# Patient Record
Sex: MALE | Race: WHITE | NOT HISPANIC OR LATINO | Employment: UNEMPLOYED | URBAN - METROPOLITAN AREA
[De-identification: names, ages, dates, MRNs, and addresses within clinical notes are randomized per-mention and may not be internally consistent; named-entity substitution may affect disease eponyms.]

---

## 2020-03-07 PROBLEM — R45.851 SUICIDAL IDEATION: Status: ACTIVE | Noted: 2020-03-07

## 2020-03-08 PROBLEM — Z59.00 HOMELESSNESS: Status: ACTIVE | Noted: 2020-03-08

## 2020-03-08 PROBLEM — F33.2 MAJOR DEPRESSIVE DISORDER, RECURRENT SEVERE WITHOUT PSYCHOTIC FEATURES: Status: ACTIVE | Noted: 2020-03-08

## 2020-03-08 PROBLEM — R63.6 UNDERWEIGHT: Status: ACTIVE | Noted: 2020-03-08

## 2020-03-08 PROBLEM — D50.9 IRON DEFICIENCY ANEMIA: Status: ACTIVE | Noted: 2020-03-08

## 2020-03-08 PROBLEM — D64.9 ANEMIA: Status: ACTIVE | Noted: 2020-03-08

## 2020-03-08 PROBLEM — G89.29 CHRONIC PAIN: Status: ACTIVE | Noted: 2020-03-08

## 2020-03-09 PROBLEM — F50.00 ANOREXIA NERVOSA: Status: ACTIVE | Noted: 2020-03-09

## 2020-03-09 PROBLEM — F17.200 TOBACCO USE DISORDER, MODERATE, DEPENDENCE: Status: ACTIVE | Noted: 2020-03-09

## 2020-03-11 PROBLEM — F60.3 BORDERLINE PERSONALITY DISORDER IN ADULT: Status: ACTIVE | Noted: 2020-03-11

## 2020-03-12 PROBLEM — F17.200 TOBACCO USE DISORDER: Status: ACTIVE | Noted: 2020-03-12

## 2020-04-16 PROBLEM — R45.851 DEPRESSION WITH SUICIDAL IDEATION: Status: ACTIVE | Noted: 2020-04-16

## 2020-04-16 PROBLEM — F32.A DEPRESSION WITH SUICIDAL IDEATION: Status: ACTIVE | Noted: 2020-04-16

## 2020-04-17 PROBLEM — F50.01 ANOREXIA NERVOSA, RESTRICTING TYPE: Status: ACTIVE | Noted: 2020-03-09

## 2020-04-21 PROBLEM — F41.9 ANXIETY: Status: ACTIVE | Noted: 2020-04-21

## 2020-06-24 ENCOUNTER — HOSPITAL ENCOUNTER (INPATIENT)
Facility: HOSPITAL | Age: 35
LOS: 2 days | Discharge: OTHER INSTITUTION | DRG: 918 | End: 2020-06-26
Attending: INTERNAL MEDICINE | Admitting: INTERNAL MEDICINE
Payer: MEDICAID

## 2020-06-24 DIAGNOSIS — T39.1X1A TYLENOL OVERDOSE: ICD-10-CM

## 2020-06-24 DIAGNOSIS — T50.901A OVERDOSE: ICD-10-CM

## 2020-06-24 LAB
ALBUMIN SERPL BCP-MCNC: 3.7 G/DL (ref 3.5–5.2)
ALBUMIN SERPL BCP-MCNC: 3.7 G/DL (ref 3.5–5.2)
ALP SERPL-CCNC: 127 U/L (ref 55–135)
ALP SERPL-CCNC: 127 U/L (ref 55–135)
ALT SERPL W/O P-5'-P-CCNC: 5631 U/L (ref 10–44)
ALT SERPL W/O P-5'-P-CCNC: 5631 U/L (ref 10–44)
AMPHET+METHAMPHET UR QL: NEGATIVE
AMYLASE SERPL-CCNC: 34 U/L (ref 20–110)
ANION GAP SERPL CALC-SCNC: 8 MMOL/L (ref 8–16)
APAP SERPL-MCNC: <3 UG/ML (ref 10–20)
APAP SERPL-MCNC: <3 UG/ML (ref 10–20)
AST SERPL-CCNC: 6022 U/L (ref 10–40)
AST SERPL-CCNC: 6022 U/L (ref 10–40)
BARBITURATES UR QL SCN>200 NG/ML: NEGATIVE
BASOPHILS # BLD AUTO: 0.03 K/UL (ref 0–0.2)
BASOPHILS NFR BLD: 0.6 % (ref 0–1.9)
BENZODIAZ UR QL SCN>200 NG/ML: NEGATIVE
BILIRUB DIRECT SERPL-MCNC: 0.4 MG/DL (ref 0.1–0.3)
BILIRUB SERPL-MCNC: 0.7 MG/DL (ref 0.1–1)
BILIRUB SERPL-MCNC: 0.7 MG/DL (ref 0.1–1)
BILIRUB UR QL STRIP: NEGATIVE
BNP SERPL-MCNC: 13 PG/ML (ref 0–99)
BUN SERPL-MCNC: 8 MG/DL (ref 6–20)
BZE UR QL SCN: NEGATIVE
CA-I BLDV-SCNC: 1.1 MMOL/L (ref 1.06–1.42)
CALCIUM SERPL-MCNC: 8.9 MG/DL (ref 8.7–10.5)
CANNABINOIDS UR QL SCN: NEGATIVE
CHLORIDE SERPL-SCNC: 105 MMOL/L (ref 95–110)
CLARITY UR REFRACT.AUTO: CLEAR
CO2 SERPL-SCNC: 24 MMOL/L (ref 23–29)
COLOR UR AUTO: YELLOW
CREAT SERPL-MCNC: 0.7 MG/DL (ref 0.5–1.4)
CREAT UR-MCNC: 29 MG/DL (ref 23–375)
DIFFERENTIAL METHOD: ABNORMAL
EOSINOPHIL # BLD AUTO: 0.2 K/UL (ref 0–0.5)
EOSINOPHIL NFR BLD: 3.1 % (ref 0–8)
ERYTHROCYTE [DISTWIDTH] IN BLOOD BY AUTOMATED COUNT: 12.8 % (ref 11.5–14.5)
EST. GFR  (AFRICAN AMERICAN): >60 ML/MIN/1.73 M^2
EST. GFR  (NON AFRICAN AMERICAN): >60 ML/MIN/1.73 M^2
ETHANOL UR-MCNC: <10 MG/DL
GLUCOSE SERPL-MCNC: 98 MG/DL (ref 70–110)
GLUCOSE UR QL STRIP: NEGATIVE
HCT VFR BLD AUTO: 35.5 % (ref 40–54)
HGB BLD-MCNC: 11.5 G/DL (ref 14–18)
HGB UR QL STRIP: NEGATIVE
IMM GRANULOCYTES # BLD AUTO: 0.01 K/UL (ref 0–0.04)
IMM GRANULOCYTES NFR BLD AUTO: 0.2 % (ref 0–0.5)
INR PPP: 1.7 (ref 0.8–1.2)
KETONES UR QL STRIP: NEGATIVE
LACTATE SERPL-SCNC: 0.9 MMOL/L (ref 0.5–2.2)
LEUKOCYTE ESTERASE UR QL STRIP: NEGATIVE
LIPASE SERPL-CCNC: 47 U/L (ref 4–60)
LYMPHOCYTES # BLD AUTO: 1.2 K/UL (ref 1–4.8)
LYMPHOCYTES NFR BLD: 24.7 % (ref 18–48)
MCH RBC QN AUTO: 28.5 PG (ref 27–31)
MCHC RBC AUTO-ENTMCNC: 32.4 G/DL (ref 32–36)
MCV RBC AUTO: 88 FL (ref 82–98)
METHADONE UR QL SCN>300 NG/ML: NEGATIVE
MONOCYTES # BLD AUTO: 0.3 K/UL (ref 0.3–1)
MONOCYTES NFR BLD: 6.7 % (ref 4–15)
NEUTROPHILS # BLD AUTO: 3.1 K/UL (ref 1.8–7.7)
NEUTROPHILS NFR BLD: 64.7 % (ref 38–73)
NITRITE UR QL STRIP: NEGATIVE
NRBC BLD-RTO: 0 /100 WBC
OPIATES UR QL SCN: NEGATIVE
PCP UR QL SCN>25 NG/ML: NEGATIVE
PH UR STRIP: 7 [PH] (ref 5–8)
PLATELET # BLD AUTO: 192 K/UL (ref 150–350)
PMV BLD AUTO: 10.4 FL (ref 9.2–12.9)
POTASSIUM SERPL-SCNC: 4.4 MMOL/L (ref 3.5–5.1)
PROT SERPL-MCNC: 6.7 G/DL (ref 6–8.4)
PROT SERPL-MCNC: 6.7 G/DL (ref 6–8.4)
PROT UR QL STRIP: NEGATIVE
PROTHROMBIN TIME: 16.7 SEC (ref 9–12.5)
RBC # BLD AUTO: 4.04 M/UL (ref 4.6–6.2)
SODIUM SERPL-SCNC: 137 MMOL/L (ref 136–145)
SP GR UR STRIP: 1.01 (ref 1–1.03)
TOXICOLOGY INFORMATION: NORMAL
TROPONIN I SERPL DL<=0.01 NG/ML-MCNC: <0.006 NG/ML (ref 0–0.03)
URN SPEC COLLECT METH UR: NORMAL
WBC # BLD AUTO: 4.78 K/UL (ref 3.9–12.7)

## 2020-06-24 PROCEDURE — 87517 HEPATITIS B DNA QUANT: CPT

## 2020-06-24 PROCEDURE — 86235 NUCLEAR ANTIGEN ANTIBODY: CPT | Mod: 91

## 2020-06-24 PROCEDURE — 82525 ASSAY OF COPPER: CPT

## 2020-06-24 PROCEDURE — 25000003 PHARM REV CODE 250: Performed by: NURSE PRACTITIONER

## 2020-06-24 PROCEDURE — 83605 ASSAY OF LACTIC ACID: CPT

## 2020-06-24 PROCEDURE — 94761 N-INVAS EAR/PLS OXIMETRY MLT: CPT

## 2020-06-24 PROCEDURE — 80307 DRUG TEST PRSMV CHEM ANLYZR: CPT

## 2020-06-24 PROCEDURE — 86703 HIV-1/HIV-2 1 RESULT ANTBDY: CPT

## 2020-06-24 PROCEDURE — 36415 COLL VENOUS BLD VENIPUNCTURE: CPT

## 2020-06-24 PROCEDURE — 82150 ASSAY OF AMYLASE: CPT

## 2020-06-24 PROCEDURE — 82728 ASSAY OF FERRITIN: CPT

## 2020-06-24 PROCEDURE — 82390 ASSAY OF CERULOPLASMIN: CPT

## 2020-06-24 PROCEDURE — 83690 ASSAY OF LIPASE: CPT

## 2020-06-24 PROCEDURE — 20000000 HC ICU ROOM

## 2020-06-24 PROCEDURE — 85025 COMPLETE CBC W/AUTO DIFF WBC: CPT

## 2020-06-24 PROCEDURE — 82150 ASSAY OF AMYLASE: CPT | Mod: 91

## 2020-06-24 PROCEDURE — 87521 HEPATITIS C PROBE&RVRS TRNSC: CPT

## 2020-06-24 PROCEDURE — 82784 ASSAY IGA/IGD/IGG/IGM EACH: CPT

## 2020-06-24 PROCEDURE — 86256 FLUORESCENT ANTIBODY TITER: CPT

## 2020-06-24 PROCEDURE — 80076 HEPATIC FUNCTION PANEL: CPT

## 2020-06-24 PROCEDURE — 63600175 PHARM REV CODE 636 W HCPCS: Performed by: NURSE PRACTITIONER

## 2020-06-24 PROCEDURE — 87529 HSV DNA AMP PROBE: CPT

## 2020-06-24 PROCEDURE — 85610 PROTHROMBIN TIME: CPT

## 2020-06-24 PROCEDURE — 86038 ANTINUCLEAR ANTIBODIES: CPT

## 2020-06-24 PROCEDURE — 83540 ASSAY OF IRON: CPT

## 2020-06-24 PROCEDURE — 82103 ALPHA-1-ANTITRYPSIN TOTAL: CPT

## 2020-06-24 PROCEDURE — 83880 ASSAY OF NATRIURETIC PEPTIDE: CPT

## 2020-06-24 PROCEDURE — 80329 ANALGESICS NON-OPIOID 1 OR 2: CPT

## 2020-06-24 PROCEDURE — 87799 DETECT AGENT NOS DNA QUANT: CPT

## 2020-06-24 PROCEDURE — 81003 URINALYSIS AUTO W/O SCOPE: CPT

## 2020-06-24 PROCEDURE — 80074 ACUTE HEPATITIS PANEL: CPT

## 2020-06-24 PROCEDURE — 82330 ASSAY OF CALCIUM: CPT

## 2020-06-24 PROCEDURE — 80329 ANALGESICS NON-OPIOID 1 OR 2: CPT | Mod: 59

## 2020-06-24 PROCEDURE — 80321 ALCOHOLS BIOMARKERS 1OR 2: CPT

## 2020-06-24 PROCEDURE — 84484 ASSAY OF TROPONIN QUANT: CPT

## 2020-06-24 PROCEDURE — 80053 COMPREHEN METABOLIC PANEL: CPT

## 2020-06-24 RX ORDER — SODIUM CHLORIDE 0.9 % (FLUSH) 0.9 %
10 SYRINGE (ML) INJECTION
Status: DISCONTINUED | OUTPATIENT
Start: 2020-06-24 | End: 2020-06-26 | Stop reason: HOSPADM

## 2020-06-24 RX ORDER — HEPARIN SODIUM 5000 [USP'U]/ML
5000 INJECTION, SOLUTION INTRAVENOUS; SUBCUTANEOUS EVERY 8 HOURS
Status: DISCONTINUED | OUTPATIENT
Start: 2020-06-24 | End: 2020-06-24

## 2020-06-24 RX ORDER — ONDANSETRON 2 MG/ML
4 INJECTION INTRAMUSCULAR; INTRAVENOUS ONCE
Status: COMPLETED | OUTPATIENT
Start: 2020-06-24 | End: 2020-06-24

## 2020-06-24 RX ORDER — ONDANSETRON 2 MG/ML
4 INJECTION INTRAMUSCULAR; INTRAVENOUS EVERY 6 HOURS PRN
Status: DISCONTINUED | OUTPATIENT
Start: 2020-06-24 | End: 2020-06-26 | Stop reason: HOSPADM

## 2020-06-24 RX ORDER — PANTOPRAZOLE SODIUM 40 MG/10ML
40 INJECTION, POWDER, LYOPHILIZED, FOR SOLUTION INTRAVENOUS DAILY
Status: DISCONTINUED | OUTPATIENT
Start: 2020-06-25 | End: 2020-06-25

## 2020-06-24 RX ADMIN — ACETYLCYSTEINE 7200 MG: 200 INJECTION, SOLUTION INTRAVENOUS at 09:06

## 2020-06-24 RX ADMIN — ONDANSETRON 4 MG: 2 INJECTION INTRAMUSCULAR; INTRAVENOUS at 08:06

## 2020-06-24 RX ADMIN — ACETYLCYSTEINE 2400 MG: 200 INJECTION, SOLUTION INTRAVENOUS at 10:06

## 2020-06-24 NOTE — PLAN OF CARE
Outside Transfer Acceptance Note        Patients name/MRN:   Dk Pereira, MRN: [id number]      Referring Physician or Mid-Level provider giving report:    Dario Wells MD     Referral Facility: [facility] MICU (All records scanned into [facility]): Admit 6/21/20     Date/Time of Acceptance:     6/24/20 at 10am     Accepting Physician for admission to hospital: MELISSA Quiroz MD ()     Accepting facility:     [facility]      Consulting Physicians from [facility] involved in case:    Dr. Stephy Lin MD., Hepatology [facility]     Reason for transfer request:       Pt has not been tested for covid, physician stated pt has not exhibited any symptoms.    Dk Pereira is a 34 y.o. male with a h/o major depressive disorder w/o PF, Anorexia Nervosa, Borderline PD, homelessness,  and sacral and calcaneal fractures.  He has been followed by psychiatry closely for SI and attempts in the past.  He was recently discharged from a 10 day psychiatric inpatient stay in 3/12/20 for acute depression, and then admitted again to inpatient psychiatry on (4/16-4/23) for anorexia nervosa and acute depressive episode.  He had suicidal ideation on that stay documented as well. His most recent stay in a psychiatry unit was apparently at [facility] on 6/1-6/18 where he was again treated for depression, anxiety and anorexia nervosa.  He was discharged to a group home afterwards, then onto the streets.      The patient now presents to [facility] ED after intentional tylenol overdose.  He is currently under PEC due to this and being combative.  He told the ED provider that he took 15-20 tylenol pills that he had purchased from [facility] on 6/20 10 to midnight, then he states he took another 30 pills at  Midnight and 2 on 6/21.  The patient has been on continuous acetylcysteine (started at but continues to have worsening liver function and enzymes. Dr. Wells requesting transfer to  facility with liver transplant capabilities.  At admission, his LFT's were   and , TB 1, Tylenol level of 243.  Current labs: CMP: BUN 6, CR 0.65, , K 4.2, ALB 3.3, TP 6.8,  ALT 6571, AST 36585, TB 0.8, INR 2.4, Tylenol <2.  CBC: WBC 3.5, HG 10.6, MCV 88, .  No radiographs were done.      He was admitted immediately to the MICU at [facility], and given NAC x 20 hours, then continued at 6.25mg/Kg/hr since that time.  He had to receive some Geodon in the ED.  Patient's mother is Stacy Gunn, cell: [phone].  His mental status has actually improved since admission.  No radiographs done.      To Do List upon arrival:     Consult Hepatology  Needs isolation precautions for COVID until screened in MICU  Continue N-acetylcysteine iv protocol at 6.25mg/kg/hr  Consult Psychiatry  Check NH3  Follow Mg, glucose, and Phos closely     Past Medical History:   Diagnosis Date    Anemia     Anorexia nervosa     Anxiety     Major depressive disorder     PTSD (post-traumatic stress disorder)      Past Psychiatric History:   Prior meds: remeron, effexor, prozac, lithium, cymbalta, gabapentin, Abilify (Hx of akathisia)  Previous psychiatric hospitalizations:   PCU w/ transfer to [facility] 10 days PTA  10 psych D/C 03/12/2020, see above for details  Previous diagnoses: MDD r/s w/o PF, Anorexia Nervosa, Borderline PD  Previous suicide attempts: multiple, OD, jumped off bridge (had to get surgical Tx)    Vital signs:     Temp:  [98.1 °F (36.7 °C)] 98.1 °F (36.7 °C)  Pulse:  [64] 64  Resp:  [20] 20  SpO2:  [100 %] 100 %  BP: (116)/(64) 116/64    Current facility-administered medications for this encounter.   N-Acetylcysteine iv    Current Outpatient Medications:     busPIRone (BUSPAR) 10 MG tablet, Take 1 tablet (10 mg total) by mouth 3 (three) times daily., Disp: 90 tablet, Rfl: 1    lithium (ESKALITH) 300 MG capsule, Take 1 capsule (300 mg total) by mouth 2 (two) times daily., Disp: 60 capsule,  Rfl: 1    mirtazapine (REMERON) 15 MG tablet, Take 1 tablet (15 mg total) by mouth nightly., Disp: 30 tablet, Rfl: 1    venlafaxine (EFFEXOR-XR) 75 MG 24 hr capsule, Take 3 capsules (225 mg total) by mouth every morning., Disp: 90 capsule, Rfl: 1    LABS:  No results found for this or any previous visit (from the past 24 hour(s)).   As per HPI    Imaging:  None     MELISSA Quiroz MD  Department of Hospital Medicine  Patient Flow [facility]/   [phone]

## 2020-06-24 NOTE — NURSING
Pt arrived to [facility]ICU with EMS from [place]; awake and alert, answering questions appropriately; NAC infusing at 26 cc/hr; placed in blue paper scrubs; house aware of need for sitter due to PEC status; CC at bedside

## 2020-06-25 PROBLEM — T39.1X2A SUICIDE ATTEMPT BY ACETAMINOPHEN OVERDOSE: Status: ACTIVE | Noted: 2020-06-25

## 2020-06-25 LAB
A1AT SERPL-MCNC: 155 MG/DL (ref 100–190)
ABO + RH BLD: NORMAL
ALBUMIN SERPL BCP-MCNC: 3.3 G/DL (ref 3.5–5.2)
ALBUMIN SERPL BCP-MCNC: 3.4 G/DL (ref 3.5–5.2)
ALBUMIN SERPL BCP-MCNC: 3.6 G/DL (ref 3.5–5.2)
ALP SERPL-CCNC: 101 U/L (ref 55–135)
ALP SERPL-CCNC: 101 U/L (ref 55–135)
ALP SERPL-CCNC: 109 U/L (ref 55–135)
ALP SERPL-CCNC: 121 U/L (ref 55–135)
ALP SERPL-CCNC: 130 U/L (ref 55–135)
ALT SERPL W/O P-5'-P-CCNC: 3307 U/L (ref 10–44)
ALT SERPL W/O P-5'-P-CCNC: 3660 U/L (ref 10–44)
ALT SERPL W/O P-5'-P-CCNC: 3781 U/L (ref 10–44)
ALT SERPL W/O P-5'-P-CCNC: 4248 U/L (ref 10–44)
ALT SERPL W/O P-5'-P-CCNC: 4248 U/L (ref 10–44)
AMYLASE SERPL-CCNC: 26 U/L (ref 20–110)
ANA SER QL IF: NORMAL
ANION GAP SERPL CALC-SCNC: 10 MMOL/L (ref 8–16)
ANION GAP SERPL CALC-SCNC: 6 MMOL/L (ref 8–16)
ANION GAP SERPL CALC-SCNC: 7 MMOL/L (ref 8–16)
ANION GAP SERPL CALC-SCNC: 8 MMOL/L (ref 8–16)
APAP SERPL-MCNC: <3 UG/ML (ref 10–20)
APAP SERPL-MCNC: <3 UG/ML (ref 10–20)
AST SERPL-CCNC: 1463 U/L (ref 10–40)
AST SERPL-CCNC: 1887 U/L (ref 10–40)
AST SERPL-CCNC: 2345 U/L (ref 10–40)
AST SERPL-CCNC: 3269 U/L (ref 10–40)
AST SERPL-CCNC: 3269 U/L (ref 10–40)
BASOPHILS # BLD AUTO: 0.03 K/UL (ref 0–0.2)
BASOPHILS # BLD AUTO: 0.04 K/UL (ref 0–0.2)
BASOPHILS NFR BLD: 0.8 % (ref 0–1.9)
BASOPHILS NFR BLD: 1.1 % (ref 0–1.9)
BASOPHILS NFR BLD: 1.3 % (ref 0–1.9)
BASOPHILS NFR BLD: 1.3 % (ref 0–1.9)
BILIRUB DIRECT SERPL-MCNC: 0.5 MG/DL (ref 0.1–0.3)
BILIRUB SERPL-MCNC: 0.5 MG/DL (ref 0.1–1)
BILIRUB SERPL-MCNC: 0.6 MG/DL (ref 0.1–1)
BILIRUB SERPL-MCNC: 0.8 MG/DL (ref 0.1–1)
BLD GP AB SCN CELLS X3 SERPL QL: NORMAL
BUN SERPL-MCNC: 6 MG/DL (ref 6–20)
BUN SERPL-MCNC: 7 MG/DL (ref 6–20)
BUN SERPL-MCNC: 8 MG/DL (ref 6–20)
BUN SERPL-MCNC: 8 MG/DL (ref 6–20)
CALCIUM SERPL-MCNC: 8.5 MG/DL (ref 8.7–10.5)
CALCIUM SERPL-MCNC: 8.5 MG/DL (ref 8.7–10.5)
CALCIUM SERPL-MCNC: 8.6 MG/DL (ref 8.7–10.5)
CALCIUM SERPL-MCNC: 9.2 MG/DL (ref 8.7–10.5)
CERULOPLASMIN SERPL-MCNC: 22 MG/DL (ref 15–45)
CHLORIDE SERPL-SCNC: 101 MMOL/L (ref 95–110)
CHLORIDE SERPL-SCNC: 103 MMOL/L (ref 95–110)
CHLORIDE SERPL-SCNC: 104 MMOL/L (ref 95–110)
CHLORIDE SERPL-SCNC: 107 MMOL/L (ref 95–110)
CO2 SERPL-SCNC: 22 MMOL/L (ref 23–29)
CO2 SERPL-SCNC: 23 MMOL/L (ref 23–29)
CO2 SERPL-SCNC: 25 MMOL/L (ref 23–29)
CO2 SERPL-SCNC: 25 MMOL/L (ref 23–29)
CREAT SERPL-MCNC: 0.6 MG/DL (ref 0.5–1.4)
CREAT SERPL-MCNC: 0.7 MG/DL (ref 0.5–1.4)
DIFFERENTIAL METHOD: ABNORMAL
EOSINOPHIL # BLD AUTO: 0.1 K/UL (ref 0–0.5)
EOSINOPHIL # BLD AUTO: 0.1 K/UL (ref 0–0.5)
EOSINOPHIL # BLD AUTO: 0.2 K/UL (ref 0–0.5)
EOSINOPHIL # BLD AUTO: 0.2 K/UL (ref 0–0.5)
EOSINOPHIL NFR BLD: 4 % (ref 0–8)
EOSINOPHIL NFR BLD: 4.3 % (ref 0–8)
EOSINOPHIL NFR BLD: 4.6 % (ref 0–8)
EOSINOPHIL NFR BLD: 4.6 % (ref 0–8)
ERYTHROCYTE [DISTWIDTH] IN BLOOD BY AUTOMATED COUNT: 12.8 % (ref 11.5–14.5)
ERYTHROCYTE [DISTWIDTH] IN BLOOD BY AUTOMATED COUNT: 12.9 % (ref 11.5–14.5)
ERYTHROCYTE [DISTWIDTH] IN BLOOD BY AUTOMATED COUNT: 13 % (ref 11.5–14.5)
ERYTHROCYTE [DISTWIDTH] IN BLOOD BY AUTOMATED COUNT: 13 % (ref 11.5–14.5)
EST. GFR  (AFRICAN AMERICAN): >60 ML/MIN/1.73 M^2
EST. GFR  (NON AFRICAN AMERICAN): >60 ML/MIN/1.73 M^2
FERRITIN SERPL-MCNC: 178 NG/ML (ref 20–300)
GLUCOSE SERPL-MCNC: 112 MG/DL (ref 70–110)
GLUCOSE SERPL-MCNC: 244 MG/DL (ref 70–110)
GLUCOSE SERPL-MCNC: 289 MG/DL (ref 70–110)
GLUCOSE SERPL-MCNC: 319 MG/DL (ref 70–110)
HAV IGM SERPL QL IA: NEGATIVE
HBV CORE IGM SERPL QL IA: NEGATIVE
HBV SURFACE AG SERPL QL IA: NEGATIVE
HCT VFR BLD AUTO: 30.7 % (ref 40–54)
HCT VFR BLD AUTO: 30.8 % (ref 40–54)
HCT VFR BLD AUTO: 31.1 % (ref 40–54)
HCT VFR BLD AUTO: 34.2 % (ref 40–54)
HCV AB SERPL QL IA: NEGATIVE
HGB BLD-MCNC: 10 G/DL (ref 14–18)
HGB BLD-MCNC: 10.1 G/DL (ref 14–18)
HGB BLD-MCNC: 10.3 G/DL (ref 14–18)
HGB BLD-MCNC: 11.1 G/DL (ref 14–18)
HIV 1+2 AB+HIV1 P24 AG SERPL QL IA: NEGATIVE
IGG SERPL-MCNC: 731 MG/DL (ref 650–1600)
IMM GRANULOCYTES # BLD AUTO: 0 K/UL (ref 0–0.04)
IMM GRANULOCYTES # BLD AUTO: 0.01 K/UL (ref 0–0.04)
IMM GRANULOCYTES NFR BLD AUTO: 0 % (ref 0–0.5)
IMM GRANULOCYTES NFR BLD AUTO: 0.3 % (ref 0–0.5)
INR PPP: 1.2 (ref 0.8–1.2)
INR PPP: 1.3 (ref 0.8–1.2)
INR PPP: 1.5 (ref 0.8–1.2)
INR PPP: 1.6 (ref 0.8–1.2)
IRON SERPL-MCNC: 16 UG/DL (ref 45–160)
LYMPHOCYTES # BLD AUTO: 0.8 K/UL (ref 1–4.8)
LYMPHOCYTES # BLD AUTO: 1 K/UL (ref 1–4.8)
LYMPHOCYTES # BLD AUTO: 1.3 K/UL (ref 1–4.8)
LYMPHOCYTES # BLD AUTO: 1.3 K/UL (ref 1–4.8)
LYMPHOCYTES NFR BLD: 26.6 % (ref 18–48)
LYMPHOCYTES NFR BLD: 32.7 % (ref 18–48)
LYMPHOCYTES NFR BLD: 33.8 % (ref 18–48)
LYMPHOCYTES NFR BLD: 35 % (ref 18–48)
MAGNESIUM SERPL-MCNC: 1.9 MG/DL (ref 1.6–2.6)
MCH RBC QN AUTO: 28.4 PG (ref 27–31)
MCH RBC QN AUTO: 28.5 PG (ref 27–31)
MCH RBC QN AUTO: 28.7 PG (ref 27–31)
MCH RBC QN AUTO: 28.8 PG (ref 27–31)
MCHC RBC AUTO-ENTMCNC: 32.5 G/DL (ref 32–36)
MCHC RBC AUTO-ENTMCNC: 32.5 G/DL (ref 32–36)
MCHC RBC AUTO-ENTMCNC: 32.9 G/DL (ref 32–36)
MCHC RBC AUTO-ENTMCNC: 33.1 G/DL (ref 32–36)
MCV RBC AUTO: 87 FL (ref 82–98)
MCV RBC AUTO: 87 FL (ref 82–98)
MCV RBC AUTO: 88 FL (ref 82–98)
MCV RBC AUTO: 88 FL (ref 82–98)
MITOCHONDRIA AB TITR SER IF: NORMAL {TITER}
MONOCYTES # BLD AUTO: 0.3 K/UL (ref 0.3–1)
MONOCYTES # BLD AUTO: 0.4 K/UL (ref 0.3–1)
MONOCYTES NFR BLD: 10.5 % (ref 4–15)
MONOCYTES NFR BLD: 11.2 % (ref 4–15)
MONOCYTES NFR BLD: 7.8 % (ref 4–15)
MONOCYTES NFR BLD: 9.8 % (ref 4–15)
NEUTROPHILS # BLD AUTO: 1.6 K/UL (ref 1.8–7.7)
NEUTROPHILS # BLD AUTO: 1.8 K/UL (ref 1.8–7.7)
NEUTROPHILS # BLD AUTO: 1.8 K/UL (ref 1.8–7.7)
NEUTROPHILS # BLD AUTO: 2 K/UL (ref 1.8–7.7)
NEUTROPHILS NFR BLD: 47.8 % (ref 38–73)
NEUTROPHILS NFR BLD: 51.3 % (ref 38–73)
NEUTROPHILS NFR BLD: 53.3 % (ref 38–73)
NEUTROPHILS NFR BLD: 57.3 % (ref 38–73)
NRBC BLD-RTO: 0 /100 WBC
PHOSPHATE SERPL-MCNC: 3.2 MG/DL (ref 2.7–4.5)
PHOSPHATE SERPL-MCNC: 3.4 MG/DL (ref 2.7–4.5)
PHOSPHATE SERPL-MCNC: 3.8 MG/DL (ref 2.7–4.5)
PHOSPHATE SERPL-MCNC: 4 MG/DL (ref 2.7–4.5)
PLATELET # BLD AUTO: 170 K/UL (ref 150–350)
PLATELET # BLD AUTO: 181 K/UL (ref 150–350)
PLATELET # BLD AUTO: 185 K/UL (ref 150–350)
PLATELET # BLD AUTO: 193 K/UL (ref 150–350)
PMV BLD AUTO: 10 FL (ref 9.2–12.9)
PMV BLD AUTO: 9.9 FL (ref 9.2–12.9)
POTASSIUM SERPL-SCNC: 3.4 MMOL/L (ref 3.5–5.1)
POTASSIUM SERPL-SCNC: 4 MMOL/L (ref 3.5–5.1)
POTASSIUM SERPL-SCNC: 4.2 MMOL/L (ref 3.5–5.1)
POTASSIUM SERPL-SCNC: 4.2 MMOL/L (ref 3.5–5.1)
PROT SERPL-MCNC: 6.1 G/DL (ref 6–8.4)
PROT SERPL-MCNC: 6.4 G/DL (ref 6–8.4)
PROT SERPL-MCNC: 6.7 G/DL (ref 6–8.4)
PROTHROMBIN TIME: 11.9 SEC (ref 9–12.5)
PROTHROMBIN TIME: 13.3 SEC (ref 9–12.5)
PROTHROMBIN TIME: 14.3 SEC (ref 9–12.5)
PROTHROMBIN TIME: 15.6 SEC (ref 9–12.5)
RBC # BLD AUTO: 3.52 M/UL (ref 4.6–6.2)
RBC # BLD AUTO: 3.54 M/UL (ref 4.6–6.2)
RBC # BLD AUTO: 3.58 M/UL (ref 4.6–6.2)
RBC # BLD AUTO: 3.87 M/UL (ref 4.6–6.2)
SARS-COV-2 RDRP RESP QL NAA+PROBE: NEGATIVE
SATURATED IRON: 6 % (ref 20–50)
SMOOTH MUSCLE AB TITR SER IF: NORMAL {TITER}
SODIUM SERPL-SCNC: 133 MMOL/L (ref 136–145)
SODIUM SERPL-SCNC: 135 MMOL/L (ref 136–145)
SODIUM SERPL-SCNC: 135 MMOL/L (ref 136–145)
SODIUM SERPL-SCNC: 138 MMOL/L (ref 136–145)
TOTAL IRON BINDING CAPACITY: 269 UG/DL (ref 250–450)
TRANSFERRIN SERPL-MCNC: 182 MG/DL (ref 200–375)
WBC # BLD AUTO: 3.05 K/UL (ref 3.9–12.7)
WBC # BLD AUTO: 3.06 K/UL (ref 3.9–12.7)
WBC # BLD AUTO: 3.66 K/UL (ref 3.9–12.7)
WBC # BLD AUTO: 3.73 K/UL (ref 3.9–12.7)

## 2020-06-25 PROCEDURE — 84100 ASSAY OF PHOSPHORUS: CPT | Mod: 91

## 2020-06-25 PROCEDURE — 84100 ASSAY OF PHOSPHORUS: CPT

## 2020-06-25 PROCEDURE — 63600175 PHARM REV CODE 636 W HCPCS: Performed by: INTERNAL MEDICINE

## 2020-06-25 PROCEDURE — 90792 PR PSYCHIATRIC DIAGNOSTIC EVALUATION W/MEDICAL SERVICES: ICD-10-PCS | Mod: AF,HB,S$PBB, | Performed by: PSYCHIATRY & NEUROLOGY

## 2020-06-25 PROCEDURE — 63600175 PHARM REV CODE 636 W HCPCS: Performed by: STUDENT IN AN ORGANIZED HEALTH CARE EDUCATION/TRAINING PROGRAM

## 2020-06-25 PROCEDURE — 80329 ANALGESICS NON-OPIOID 1 OR 2: CPT

## 2020-06-25 PROCEDURE — 85610 PROTHROMBIN TIME: CPT

## 2020-06-25 PROCEDURE — U0002 COVID-19 LAB TEST NON-CDC: HCPCS

## 2020-06-25 PROCEDURE — 11000001 HC ACUTE MED/SURG PRIVATE ROOM

## 2020-06-25 PROCEDURE — 80053 COMPREHEN METABOLIC PANEL: CPT

## 2020-06-25 PROCEDURE — 25000003 PHARM REV CODE 250: Performed by: INTERNAL MEDICINE

## 2020-06-25 PROCEDURE — 90792 PSYCH DIAG EVAL W/MED SRVCS: CPT | Mod: AF,HB,S$PBB, | Performed by: PSYCHIATRY & NEUROLOGY

## 2020-06-25 PROCEDURE — 25000003 PHARM REV CODE 250: Performed by: HOSPITALIST

## 2020-06-25 PROCEDURE — 85025 COMPLETE CBC W/AUTO DIFF WBC: CPT

## 2020-06-25 PROCEDURE — 63600175 PHARM REV CODE 636 W HCPCS: Performed by: EMERGENCY MEDICINE

## 2020-06-25 PROCEDURE — 36415 COLL VENOUS BLD VENIPUNCTURE: CPT

## 2020-06-25 PROCEDURE — 63600175 PHARM REV CODE 636 W HCPCS: Performed by: NURSE PRACTITIONER

## 2020-06-25 PROCEDURE — 25000003 PHARM REV CODE 250: Performed by: STUDENT IN AN ORGANIZED HEALTH CARE EDUCATION/TRAINING PROGRAM

## 2020-06-25 PROCEDURE — 85610 PROTHROMBIN TIME: CPT | Mod: 91

## 2020-06-25 PROCEDURE — 25000003 PHARM REV CODE 250: Performed by: NURSE PRACTITIONER

## 2020-06-25 PROCEDURE — C9113 INJ PANTOPRAZOLE SODIUM, VIA: HCPCS | Performed by: EMERGENCY MEDICINE

## 2020-06-25 PROCEDURE — 83735 ASSAY OF MAGNESIUM: CPT

## 2020-06-25 PROCEDURE — 87040 BLOOD CULTURE FOR BACTERIA: CPT | Mod: 59

## 2020-06-25 PROCEDURE — 86850 RBC ANTIBODY SCREEN: CPT

## 2020-06-25 PROCEDURE — 80076 HEPATIC FUNCTION PANEL: CPT

## 2020-06-25 PROCEDURE — 99291 PR CRITICAL CARE, E/M 30-74 MINUTES: ICD-10-PCS | Mod: ,,, | Performed by: INTERNAL MEDICINE

## 2020-06-25 PROCEDURE — 99233 SBSQ HOSP IP/OBS HIGH 50: CPT | Mod: ,,, | Performed by: INTERNAL MEDICINE

## 2020-06-25 PROCEDURE — 99291 CRITICAL CARE FIRST HOUR: CPT | Mod: ,,, | Performed by: INTERNAL MEDICINE

## 2020-06-25 PROCEDURE — 94761 N-INVAS EAR/PLS OXIMETRY MLT: CPT

## 2020-06-25 PROCEDURE — 99233 PR SUBSEQUENT HOSPITAL CARE,LEVL III: ICD-10-PCS | Mod: ,,, | Performed by: INTERNAL MEDICINE

## 2020-06-25 RX ORDER — MAGNESIUM SULFATE HEPTAHYDRATE 40 MG/ML
2 INJECTION, SOLUTION INTRAVENOUS ONCE
Status: COMPLETED | OUTPATIENT
Start: 2020-06-25 | End: 2020-06-25

## 2020-06-25 RX ORDER — PANTOPRAZOLE SODIUM 40 MG/1
40 TABLET, DELAYED RELEASE ORAL DAILY
Status: DISCONTINUED | OUTPATIENT
Start: 2020-06-26 | End: 2020-06-26 | Stop reason: HOSPADM

## 2020-06-25 RX ORDER — LIDOCAINE 50 MG/G
1 PATCH TOPICAL
Status: DISCONTINUED | OUTPATIENT
Start: 2020-06-25 | End: 2020-06-26 | Stop reason: HOSPADM

## 2020-06-25 RX ORDER — POTASSIUM CHLORIDE 20 MEQ/1
40 TABLET, EXTENDED RELEASE ORAL ONCE
Status: COMPLETED | OUTPATIENT
Start: 2020-06-25 | End: 2020-06-25

## 2020-06-25 RX ADMIN — PHYTONADIONE 10 MG: 10 INJECTION, EMULSION INTRAMUSCULAR; INTRAVENOUS; SUBCUTANEOUS at 08:06

## 2020-06-25 RX ADMIN — CEFTRIAXONE 2 G: 2 INJECTION, SOLUTION INTRAVENOUS at 08:06

## 2020-06-25 RX ADMIN — POTASSIUM CHLORIDE 40 MEQ: 1500 TABLET, EXTENDED RELEASE ORAL at 06:06

## 2020-06-25 RX ADMIN — ACETYLCYSTEINE 6.25 MG/KG/HR: 200 INJECTION, SOLUTION INTRAVENOUS at 08:06

## 2020-06-25 RX ADMIN — ACETYLCYSTEINE 4800 MG: 200 INJECTION, SOLUTION INTRAVENOUS at 02:06

## 2020-06-25 RX ADMIN — LIDOCAINE 1 PATCH: 50 PATCH CUTANEOUS at 08:06

## 2020-06-25 RX ADMIN — PANTOPRAZOLE SODIUM 40 MG: 40 INJECTION, POWDER, LYOPHILIZED, FOR SOLUTION INTRAVENOUS at 08:06

## 2020-06-25 RX ADMIN — MAGNESIUM SULFATE 2 G: 2 INJECTION INTRAVENOUS at 06:06

## 2020-06-25 RX ADMIN — ONDANSETRON 4 MG: 2 INJECTION, SOLUTION INTRAMUSCULAR; INTRAVENOUS at 05:06

## 2020-06-25 NOTE — ASSESSMENT & PLAN NOTE
Patient presents to hospital for intentional tyleonol overdose. Patient is unclear what dose he used, but estimates roughly 50 pills. AST and ALT elevated to 6k and 5.6 k. Has been treating with NAC continuously since then. Has been treated using 20 hour protocol with 150 mg/kg followed by 50 mg/kg, followed by 100 mg/kg. Tylenol levels currently undetectable and most recent AST and ALT 3269 and 4248. INR remains elevated at 1.6 from 1.7.    - Recommend to continue NAC at a maintenance dose for additional 24 hours. Although AST and ALT improving, INR still elevated to 1.6. Can continue with 4th dose per order set at 6.25 mg/kg/hr; pharmacy can help adjust dose as appropriate. Continue to follow LFT and INR

## 2020-06-25 NOTE — SUBJECTIVE & OBJECTIVE
"  Family History     None        Tobacco Use    Smoking status: Current Every Day Smoker    Smokeless tobacco: Never Used   Substance and Sexual Activity    Alcohol use: Never     Frequency: Never    Drug use: Never    Sexual activity: Not on file     Psychotherapeutics (From admission, onward)    None           Review of Systems  Objective:     Vital Signs (Most Recent):  Temp: 98.4 °F (36.9 °C) (06/25/20 1100)  Pulse: 83 (06/25/20 1100)  Resp: 20 (06/25/20 1100)  BP: 105/70 (06/25/20 1100)  SpO2: 97 % (06/25/20 1100) Vital Signs (24h Range):  Temp:  [97.6 °F (36.4 °C)-98.4 °F (36.9 °C)] 98.4 °F (36.9 °C)  Pulse:  [] 83  Resp:  [12-76] 20  SpO2:  [96 %-100 %] 97 %  BP: ()/(54-79) 105/70     Height: 5' 6" (167.6 cm)  Weight: 48.2 kg (106 lb 4.2 oz)  Body mass index is 17.15 kg/m².      Intake/Output Summary (Last 24 hours) at 6/25/2020 1141  Last data filed at 6/25/2020 1000  Gross per 24 hour   Intake 268 ml   Output 2820 ml   Net -2552 ml       Physical Exam  Psychiatric:      Comments: Appearance: Appears stated age, adequately groomed  Behavior: calm and cooperative with interview, adequate eye contact  Motor: no PMA/PMR, no tics or tremors  Speech: normal rate, normal volume, normal quantity  Mood: "indifferent"  Affect: nervous smile, isolation of affect  Thought Content: +SI, -HI, no delusional content elicited   Thought Process: linear, goal directed  Perceptual Disturbances: -AH, -VH, not actively responding to internal stimuli, does not appear internally preoccupied   Orientation: A&Ox4  Memory: intact recent, intact remote  Attention: did not assess [facility]  Insight: limited to fair   Judgement: poor          Significant Labs: All pertinent labs within the past 24 hours have been reviewed.    Significant Imaging: I have reviewed all pertinent imaging results/findings within the past 24 hours.  "

## 2020-06-25 NOTE — H&P
[facility]  Critical Care Medicine  History & Physical    Patient Name: Dk Pereira  MRN: [id number]  Admission Date: 6/24/2020  Hospital Length of Stay: 1 days  Code Status: Full Code  Attending Physician: Adams Walsh MD   Primary Care Provider: Primary Doctor No   Principal Problem: <principal problem not specified>    Subjective:     HPI:  Dk Pereira is a 34 y.o. male with a h/o major depressive disorder w/o PF, Anorexia Nervosa, Borderline PD, homelessness,  and sacral and calcaneal fractures.  He has been followed by psychiatry closely for SI and attempts in the past ( x2).  He was recently discharged from a 10 day psychiatric inpatient stay in 3/12/20 for acute depression, and then admitted again to inpatient psychiatry on (4/16-4/23) for anorexia nervosa and acute depressive episode.  He had suicidal ideation on that stay documented as well. His most recent stay in a psychiatry unit was apparently at [facility] on 6/1-6/18 where he was again treated for depression, anxiety and anorexia nervosa.  He was discharged to a group home afterwards, then onto the streets.       The patient now presents to [facility] ED after intentional tylenol overdose.  He told the ED provider that he took 15-20 tylenol pills at midnight  then he states he took another 30 pills at  2am on 6/21.At admission, his LFT's were   and , T. Bili 1, Tylenol level of 243. Given NAC x 20 hours, then continued at 6.25mg/Kg/hr since that time. AST/ALT have continued to increase. Now AST 6022 ALT 5631. INR 1.7.  The patient has been on continuous acetylcysteine. Dr. Wells requesting transfer to facility with liver transplant capabilities.               Hospital/ICU Course:  6/21: presented to [facility] for intentional tylenol OD. Was admitted and started on NAC.   6/24: His transaminites continued to worsen and he was transferred to [facility] for liver TP eval         Past Medical History:   Diagnosis Date    Anemia     Anorexia nervosa     Anxiety     Major depressive disorder     PTSD (post-traumatic stress disorder)        Past Surgical History:   Procedure Laterality Date    BACK SURGERY      FRACTURE SURGERY         Review of patient's allergies indicates:  No Known Allergies    Family History     None        Tobacco Use    Smoking status: Current Every Day Smoker    Smokeless tobacco: Never Used   Substance and Sexual Activity    Alcohol use: Never     Frequency: Never    Drug use: Never    Sexual activity: Not on file      Review of Systems   Constitutional: Negative for chills and fever.   HENT: Negative for drooling and sore throat.    Eyes: Negative for pain and discharge.   Respiratory: Negative for cough and shortness of breath.    Cardiovascular: Negative for chest pain and palpitations.   Gastrointestinal: Positive for abdominal pain. Negative for diarrhea, nausea and vomiting.   Endocrine: Negative for polyuria.   Genitourinary: Negative for dysuria and flank pain.   Musculoskeletal: Negative for back pain and myalgias.   Skin: Negative for rash and wound.   Neurological: Negative for seizures, weakness, numbness and headaches.     Objective:     Vital Signs (Most Recent):  Temp: 97.7 °F (36.5 °C) (06/24/20 2300)  Pulse: 71 (06/25/20 0200)  Resp: 17 (06/25/20 0200)  BP: (!) 93/59 (06/25/20 0200)  SpO2: 98 % (06/25/20 0200) Vital Signs (24h Range):  Temp:  [97.7 °F (36.5 °C)-98.1 °F (36.7 °C)] 97.7 °F (36.5 °C)  Pulse:  [64-84] 71  Resp:  [12-32] 17  SpO2:  [97 %-100 %] 98 %  BP: ()/(54-79) 93/59   Weight: 48.2 kg (106 lb 4.2 oz)  Body mass index is 17.15 kg/m².      Intake/Output Summary (Last 24 hours) at 6/25/2020 0219  Last data filed at 6/25/2020 0200  Gross per 24 hour   Intake --   Output 1650 ml   Net -1650 ml       Physical Exam  Vitals signs and nursing note reviewed.   Constitutional:       Appearance: He is not ill-appearing or  toxic-appearing.   HENT:      Head: Normocephalic and atraumatic.      Nose: Nose normal. No congestion.      Mouth/Throat:      Mouth: Mucous membranes are moist.   Eyes:      Extraocular Movements: Extraocular movements intact.      Conjunctiva/sclera: Conjunctivae normal.      Pupils: Pupils are equal, round, and reactive to light.   Neck:      Musculoskeletal: Normal range of motion. No muscular tenderness.   Cardiovascular:      Rate and Rhythm: Normal rate.      Pulses: Normal pulses.      Heart sounds: Normal heart sounds.   Pulmonary:      Effort: Pulmonary effort is normal.      Breath sounds: Normal breath sounds.   Abdominal:      General: Abdomen is flat. There is no distension.      Palpations: Abdomen is soft.      Tenderness: There is abdominal tenderness (RUQ ). There is no guarding.   Musculoskeletal: Normal range of motion.         General: No swelling or tenderness.   Skin:     General: Skin is warm.      Coloration: Skin is not jaundiced.      Findings: No bruising.   Neurological:      General: No focal deficit present.      Mental Status: He is alert and oriented to person, place, and time.      Sensory: No sensory deficit.      Motor: No weakness.         Vents:     Lines/Drains/Airways     Peripheral Intravenous Line                 Peripheral IV - Single Lumen 06/24/20 1840 20 G Left Wrist less than 1 day         Peripheral IV - Single Lumen 06/24/20 1840 20 G Right Wrist less than 1 day         Peripheral IV - Single Lumen 06/24/20 1859 18 G Right Antecubital less than 1 day              Significant Labs:    CBC/Anemia Profile:  Recent Labs   Lab 06/24/20 1926 06/24/20  2247   WBC 4.78  --    HGB 11.5*  --    HCT 35.5*  --      --    MCV 88  --    RDW 12.8  --    IRON  --  16*   FERRITIN  --  178        Chemistries:  Recent Labs   Lab 06/24/20 1958      K 4.4      CO2 24   BUN 8   CREATININE 0.7   CALCIUM 8.9   ALBUMIN 3.7  3.7   PROT 6.7  6.7   BILITOT 0.7  0.7    ALKPHOS 127  127   ALT 5,631*  5,631*   AST 6,022*  6,022*     INR 1.7  Lipase 47    Significant Imaging: I have reviewed all pertinent imaging results/findings within the past 24 hours.    Assessment/Plan:     Neuro  Chronic pain  - 2/2 sacral and calcaneal fractures.  - Resting comfortably at this time.   - if pain medication is necessary, use low dose opiates as they are hepatically metabolized     Psychiatric  Anxiety  - see MDDO     Generalized anxiety disorder  - see MDDO     Depression  - see MDDO     Borderline personality disorder  - see MDDO     Major depressive disorder, recurrent episode, moderate  - Hx of SI with attempts in the past ( x2).  He was recently discharged from a 10 day psychiatric inpatient stay in 3/12/20 for acute depression, and then admitted again to inpatient psychiatry on (4/16-4/23) for anorexia nervosa and acute depressive episode.   - He will likely need inpatient treatment following discharge.   - Will consult psych     Endocrine  Underweight  - Hx of anorexia.   - On regular diet.  - Zofran PRN     Other  Tylenol overdose  - Per Pt he took 15-20 tylenol pills (500mg each) on 6/20 , then he states he took another 30 pills at  Midnight. Potentially he ingesting 59029- [id number]. Toxic level is >10g or >200mg/kg. There was also considerable delay in his presentation to the ER.   - At admission, his LFT's were   and , T. Bili 1, Tylenol level of 243.  - Given NAC x 20 hours, then continued at 6.25mg/Kg/hr since that time. AST/ALT have continued to increase. Now AST 6022 ALT 5631. INR 1.7. continue Vit k 10mg IV x3 days   - Continue NAC. Tylenol now < 3. Continue to trend CBC, CMP, Phos, INR q6h  - Hepatitis workup in process. F/u RUQ US.   - Liver TP consult.           Critical Care Daily Checklist:    A: Awake: RASS Goal/Actual Goal:    Actual: Chandler Agitation Sedation Scale (RASS): Alert and calm   B: Spontaneous Breathing Trial Performed?  No    C: SAT & SBT  Coordinated?  No                      D: Delirium: CAM-ICU Overall CAM-ICU: Negative   E: Early Mobility Performed? Yes   F: Feeding Goal:    Status:     Current Diet Order   Procedures    Diet Adult Regular (IDDSI Level 7) [facility]; PEC/CEC - Styrofoam     Order Specific Question:   Indicate patient location for additional diet options:     Answer:   [facility]     Order Specific Question:   Tray type:     Answer:   PEC/CEC - Styrofoam      AS: Analgesia/Sedation No    T: Thromboembolic Prophylaxis No    H: HOB > 300 Yes   U: Stress Ulcer Prophylaxis (if needed) No    G: Glucose Control No    B: Bowel Function  No    I: Indwelling Catheter (Lines & Posadas) Necessity No    D: De-escalation of Antimicrobials/Pharmacotherapies No     Plan for the day/ETD No     Code Status:  Family/Goals of Care: Full Code         Critical secondary to Patient has a condition that poses threat to life and bodily function: Liver failure      Critical care was time spent personally by me on the following activities: development of treatment plan with patient or surrogate and bedside caregivers, discussions with consultants, evaluation of patient's response to treatment, examination of patient, ordering and performing treatments and interventions, ordering and review of laboratory studies, ordering and review of radiographic studies, pulse oximetry, re-evaluation of patient's condition. This critical care time did not overlap with that of any other provider or involve time for any procedures.     Noble Dangelo MD  Critical Care Medicine  [facility]

## 2020-06-25 NOTE — SUBJECTIVE & OBJECTIVE
Past Medical History:   Diagnosis Date    Anemia     Anorexia nervosa     Anxiety     Major depressive disorder     PTSD (post-traumatic stress disorder)        Past Surgical History:   Procedure Laterality Date    BACK SURGERY      FRACTURE SURGERY         Review of patient's allergies indicates:  No Known Allergies    Family History     None        Tobacco Use    Smoking status: Current Every Day Smoker    Smokeless tobacco: Never Used   Substance and Sexual Activity    Alcohol use: Never     Frequency: Never    Drug use: Never    Sexual activity: Not on file      Review of Systems   Constitutional: Negative for chills and fever.   HENT: Negative for drooling and sore throat.    Eyes: Negative for pain and discharge.   Respiratory: Negative for cough and shortness of breath.    Cardiovascular: Negative for chest pain and palpitations.   Gastrointestinal: Positive for abdominal pain. Negative for diarrhea, nausea and vomiting.   Endocrine: Negative for polyuria.   Genitourinary: Negative for dysuria and flank pain.   Musculoskeletal: Negative for back pain and myalgias.   Skin: Negative for rash and wound.   Neurological: Negative for seizures, weakness, numbness and headaches.     Objective:     Vital Signs (Most Recent):  Temp: 97.7 °F (36.5 °C) (06/24/20 2300)  Pulse: 71 (06/25/20 0200)  Resp: 17 (06/25/20 0200)  BP: (!) 93/59 (06/25/20 0200)  SpO2: 98 % (06/25/20 0200) Vital Signs (24h Range):  Temp:  [97.7 °F (36.5 °C)-98.1 °F (36.7 °C)] 97.7 °F (36.5 °C)  Pulse:  [64-84] 71  Resp:  [12-32] 17  SpO2:  [97 %-100 %] 98 %  BP: ()/(54-79) 93/59   Weight: 48.2 kg (106 lb 4.2 oz)  Body mass index is 17.15 kg/m².      Intake/Output Summary (Last 24 hours) at 6/25/2020 0219  Last data filed at 6/25/2020 0200  Gross per 24 hour   Intake --   Output 1650 ml   Net -1650 ml       Physical Exam  Vitals signs and nursing note reviewed.   Constitutional:       Appearance: He is not ill-appearing or  toxic-appearing.   HENT:      Head: Normocephalic and atraumatic.      Nose: Nose normal. No congestion.      Mouth/Throat:      Mouth: Mucous membranes are moist.   Eyes:      Extraocular Movements: Extraocular movements intact.      Conjunctiva/sclera: Conjunctivae normal.      Pupils: Pupils are equal, round, and reactive to light.   Neck:      Musculoskeletal: Normal range of motion. No muscular tenderness.   Cardiovascular:      Rate and Rhythm: Normal rate.      Pulses: Normal pulses.      Heart sounds: Normal heart sounds.   Pulmonary:      Effort: Pulmonary effort is normal.      Breath sounds: Normal breath sounds.   Abdominal:      General: Abdomen is flat. There is no distension.      Palpations: Abdomen is soft.      Tenderness: There is abdominal tenderness (RUQ ). There is no guarding.   Musculoskeletal: Normal range of motion.         General: No swelling or tenderness.   Skin:     General: Skin is warm.      Coloration: Skin is not jaundiced.      Findings: No bruising.   Neurological:      General: No focal deficit present.      Mental Status: He is alert and oriented to person, place, and time.      Sensory: No sensory deficit.      Motor: No weakness.         Vents:     Lines/Drains/Airways     Peripheral Intravenous Line                 Peripheral IV - Single Lumen 06/24/20 1840 20 G Left Wrist less than 1 day         Peripheral IV - Single Lumen 06/24/20 1840 20 G Right Wrist less than 1 day         Peripheral IV - Single Lumen 06/24/20 1859 18 G Right Antecubital less than 1 day              Significant Labs:    CBC/Anemia Profile:  Recent Labs   Lab 06/24/20 1926 06/24/20  2247   WBC 4.78  --    HGB 11.5*  --    HCT 35.5*  --      --    MCV 88  --    RDW 12.8  --    IRON  --  16*   FERRITIN  --  178        Chemistries:  Recent Labs   Lab 06/24/20 1958      K 4.4      CO2 24   BUN 8   CREATININE 0.7   CALCIUM 8.9   ALBUMIN 3.7  3.7   PROT 6.7  6.7   BILITOT 0.7  0.7    ALKPHOS 127  127   ALT 5,631*  5,631*   AST 6,022*  6,022*     INR 1.7  Lipase 47    Significant Imaging: I have reviewed all pertinent imaging results/findings within the past 24 hours.

## 2020-06-25 NOTE — CONSULTS
[facility]  Hepatology  Consult Note    Patient Name: Dk Pereira  MRN: [id number]  Admission Date: 6/24/2020  Hospital Length of Stay: 1 days  Attending Provider: Adams Walsh MD   Primary Care Physician: Primary Doctor No  Principal Problem:<principal problem not specified>    Inpatient consult to Hepatology  Consult performed by: Aman Witt MD  Consult ordered by: Roberto Carlos Gee MD        Subjective:     Transplant status: No    HPI:  Patient is a 34 year old male with extensive psychiatric history including major depressive disorder w/o PF, Anorexia Nervosa, and Borderline PD who presents to hospital as a transfer from [place] for intentional tylenol overdose; transferred for transplant capable facility. Briefly, patient has had 3 hospital stays for psychiatric issues over the last 3 months; two of which involved suicidal ideation. Shortly after the last stay, patient was discharged to a group home but eventually went back onto the streets as he is homeless. He reported taking roughly 50 tylenol pills, he is unsure of the dose, in the span of a few hours. His AST and ALT went from 320 and 226 to 6022 and 5631; tylenol level at admit was 243. He was treated with NAC for 20 hours and transferred with NAC continued and 6.25 mg/kg/hr.     At the time of my exam, patient has flat affect. He reports continued suicidal ideation. His mood fluctuates between feeling normal and severe depressive episodes. He also endorses nausea. He denies headache, dizziness, chest pain,abdominal pain, vomiting, diarrhea, fevers, or chills.         Review of Systems   Constitutional: Negative for activity change, chills and fever.   HENT: Negative for congestion and sore throat.    Respiratory: Negative for cough, chest tightness, shortness of breath and wheezing.    Cardiovascular: Negative for chest pain, palpitations and leg swelling.   Gastrointestinal: Positive for abdominal pain (mild, but  improved since admit) and nausea. Negative for constipation, diarrhea and vomiting.   Genitourinary: Negative for dysuria and flank pain.   Musculoskeletal: Negative for arthralgias, back pain and myalgias.   Skin: Negative for color change and pallor.   Neurological: Negative for dizziness, weakness and headaches.   Psychiatric/Behavioral: Positive for dysphoric mood and suicidal ideas.       Past Medical History:   Diagnosis Date    Anemia     Anorexia nervosa     Anxiety     Major depressive disorder     PTSD (post-traumatic stress disorder)        Past Surgical History:   Procedure Laterality Date    BACK SURGERY      FRACTURE SURGERY         Family history of liver disease: No    Review of patient's allergies indicates:   Allergen Reactions    Pork/porcine containing products        Tobacco Use    Smoking status: Current Every Day Smoker    Smokeless tobacco: Never Used   Substance and Sexual Activity    Alcohol use: Never     Frequency: Never    Drug use: Never    Sexual activity: Not on file       Medications Prior to Admission   Medication Sig Dispense Refill Last Dose    busPIRone (BUSPAR) 10 MG tablet Take 1 tablet (10 mg total) by mouth 3 (three) times daily. 90 tablet 1 More than a month at Unknown time    lithium (ESKALITH) 300 MG capsule Take 1 capsule (300 mg total) by mouth 2 (two) times daily. 60 capsule 1 More than a month at Unknown time    mirtazapine (REMERON) 15 MG tablet Take 1 tablet (15 mg total) by mouth nightly. 30 tablet 1 More than a month at Unknown time    venlafaxine (EFFEXOR-XR) 75 MG 24 hr capsule Take 3 capsules (225 mg total) by mouth every morning. 90 capsule 1 More than a month at Unknown time       Objective:     Vital Signs (Most Recent):  Temp: 98.4 °F (36.9 °C) (06/25/20 1100)  Pulse: 83 (06/25/20 1100)  Resp: 20 (06/25/20 1100)  BP: 105/70 (06/25/20 1100)  SpO2: 97 % (06/25/20 1100) Vital Signs (24h Range):  Temp:  [97.6 °F (36.4 °C)-98.4 °F (36.9 °C)] 98.4  °F (36.9 °C)  Pulse:  [] 83  Resp:  [12-76] 20  SpO2:  [96 %-100 %] 97 %  BP: ()/(54-79) 105/70     Weight: 48.2 kg (106 lb 4.2 oz) (06/24/20 1845)  Body mass index is 17.15 kg/m².    Physical Exam  Constitutional:       Appearance: Normal appearance. He is well-developed. He is not toxic-appearing or diaphoretic.   HENT:      Head: Normocephalic and atraumatic.      Nose: Nose normal.   Eyes:      General: No scleral icterus.     Conjunctiva/sclera: Conjunctivae normal.   Neck:      Musculoskeletal: Normal range of motion and neck supple.   Cardiovascular:      Rate and Rhythm: Normal rate and regular rhythm.      Heart sounds: Normal heart sounds.   Pulmonary:      Effort: Pulmonary effort is normal.      Breath sounds: Normal breath sounds. No wheezing or rales.   Abdominal:      General: Bowel sounds are normal. There is no distension.      Palpations: Abdomen is soft.      Tenderness: There is abdominal tenderness (mild, diffuse).   Musculoskeletal: Normal range of motion.      Right lower leg: No edema.      Left lower leg: No edema.   Skin:     General: Skin is warm and dry.      Coloration: Skin is not jaundiced.   Neurological:      Mental Status: He is alert and oriented to person, place, and time.   Psychiatric:      Comments: Persistent suicidal thoughts         MELD-Na score: 11 at 6/25/2020 10:00 AM  MELD score: 11 at 6/25/2020 10:00 AM  Calculated from:  Serum Creatinine: 0.7 mg/dL (Rounded to 1 mg/dL) at 6/25/2020 10:00 AM  Serum Sodium: 135 mmol/L at 6/25/2020 10:00 AM  Total Bilirubin: 0.8 mg/dL (Rounded to 1 mg/dL) at 6/25/2020 10:00 AM  INR(ratio): 1.5 at 6/25/2020 10:00 AM  Age: 34 years 9 months    Significant Labs:  Labs within the past month have been reviewed.    Significant Imaging:  Reviewed    Assessment/Plan:     Tylenol overdose  Patient presents to hospital for intentional tyleonol overdose. Patient is unclear what dose he used, but estimates roughly 50 pills. AST and ALT  elevated to 6k and 5.6 k. Has been treating with NAC continuously since then. Has been treated using 20 hour protocol with 150 mg/kg followed by 50 mg/kg, followed by 100 mg/kg. Tylenol levels currently undetectable and most recent AST and ALT 3269 and 4248. INR remains elevated at 1.6 from 1.7.    - Recommend to continue NAC at a maintenance dose for additional 24 hours. Although AST and ALT improving, INR still elevated to 1.6. Can continue with 4th dose per order set at 6.25 mg/kg/hr; pharmacy can help adjust dose as appropriate. Continue to follow LFT and INR        Thank you for your consult. I will follow-up with patient. Please contact us if you have any additional questions.    Aman Witt MD  Hepatology  [facility]

## 2020-06-25 NOTE — NURSING TRANSFER
Nursing Transfer Note      6/25/2020     Transfer To: [address]    Transfer via wheelchair    Transfer with cardiac monitoring    Transported by ASHLYE Marcial, Sitter PCT, Security guards x 2    Medicines sent: Mucmyst due at 19:00    Chart send with patient: Yes    Notified: Pt, no family in town    Patient reassessed at: 1700 06/25/20    Upon arrival to floor: patient oriented to room, call bell in reach and bed in lowest position.    Pt handed off to ASHLEY Malhotra along with chart and personal belongings.     No acute events during transport.

## 2020-06-25 NOTE — TREATMENT PLAN
Hepatology Treatment Plan    Dk Pereira is a 34 y.o. male admitted to hospital 6/24/2020 (Hospital Day: 1) due to <principal problem not specified>.     Interval History  34 year old with history of MDD anorexia, borderline PD, homeless, follows psychiatry closely for suicidal ideations in the past was recently discharged from inpatient psych for acute depression. She presented to  ED with intentional tylenol overdose, patient placed under PEC. Per ED provider patient took 15-20 tylenol pills on 6/20 10PM to 12 AM and another 30 pills at 12 AM to 2 AM on 6/21. Patient was started on NAC, LFTS trending up, so patient was transferred to [facility] for Acute liver injury. At admission,  LFT's were   and , TB 1, Tylenol level of 243.  lab trend: CMP: BUN 6, CR 0.65, , K 4.2, ALB 3.3, TP 6.8,  ALT 6571, AST 33840, TB 0.8, INR 2.4, Tylenol <2.  CBC: WBC 3.5, HG 10.6, MCV 88, . Discussed with ICU fellow: Patient's mental status currently is AAAOX3, no asterixis.      Objective  Temp:  [97.7 °F (36.5 °C)-98.1 °F (36.7 °C)] 98 °F (36.7 °C) (06/24 1910)  Pulse:  [64-84] 77 (06/24 2000)  BP: (104-121)/(64-78) 110/74 (06/24 2000)  Resp:  [16-29] 29 (06/24 2000)  SpO2:  [99 %-100 %] 99 % (06/24 2000)      Laboratory    Lab Results   Component Value Date    WBC 4.78 06/24/2020    HGB 11.5 (L) 06/24/2020    HCT 35.5 (L) 06/24/2020    MCV 88 06/24/2020     06/24/2020       Lab Results   Component Value Date     06/24/2020    K 4.4 06/24/2020     06/24/2020    CO2 24 06/24/2020    BUN 8 06/24/2020    CREATININE 0.7 06/24/2020    CALCIUM 8.9 06/24/2020       Lab Results   Component Value Date    ALBUMIN 3.7 06/24/2020    ALBUMIN 3.7 06/24/2020    ALT 12 04/17/2020    AST 16 04/17/2020    ALKPHOS 127 06/24/2020    ALKPHOS 127 06/24/2020    BILITOT 0.7 06/24/2020    BILITOT 0.7 06/24/2020       Lab Results   Component Value Date    INR 1.7 (H) 06/24/2020       MELD-Na score: 12 at  6/24/2020  7:58 PM  MELD score: 12 at 6/24/2020  7:58 PM  Calculated from:  Serum Creatinine: 0.7 mg/dL (Rounded to 1 mg/dL) at 6/24/2020  7:58 PM  Serum Sodium: 137 mmol/L at 6/24/2020  7:58 PM  Total Bilirubin: 0.7 mg/dL (Rounded to 1 mg/dL) at 6/24/2020  7:58 PM  INR(ratio): 1.7 at 6/24/2020  7:58 PM  Age: 34 years 9 months    Acute liver Injury (INR 1.7) 2/2 Tylenol overdose, mental status intact.    Management discussed with ICU fellow.     Plan  Diagnostic/Management  - NAC gtt  - Empiric Antibiotics: Ceftriaxone 2g daily  - Blood cultures, UA/UCx, CXR, other cultures as indicated clinically  - ABG, lactate  - RUQ ultrasound with doppler  - Diagnostic paracentesis and send studies for WBC count with diff, albumin, total protein, and cultures   - Infectious serologies: acute hepatitis panel, HBV DNA, HCV RNA, EBV PCR, CMV PCR and HSV PCR, HIV  - Auto-immune serologies: MARCO, ASMA, AMA, IGG  - Amylase and Lipase  - Iron panel and ferritin  - Alpha-1-antitrypsin  - Ceruloplasmin and copper levels  - PETH. Urine Tox. Addiction Psych consult if indicated  - Coagulopathy: Vitamin K 10mg IV x3 days.   - Stress Ulcer: Protonix 40mg daily  - Only give FFP or Platelets for invasive procedures or active bleeding.  - Labs: Trend CBC, CMP, Phos, INR q6h  - FSBS: monitor closely initially q4h  - Neurochecks q1h. STAT CT head for any changes in neuro exam  - Please call the on-call fellow for any acute changes in patient's clinical status.  - please obtain daily CBC, BMP, LFT, INR  - Plan of care was discussed with primary team    Thank you for involving us in the care of Dk Pereira. Please call with any additional concerns or questions.    Tyree Briones MD  Gastroenterology Fellow

## 2020-06-25 NOTE — PLAN OF CARE
Extended Emergency Contact Information  Primary Emergency Contact: Stacy Gunn  Home Phone: [phone]  Relation: Mother      [facility]  [address]  [facility] [place] [address]  Phone: [phone] Fax: [phone]    Payor: MEDICAID / Plan: [place]UE [place] / Product Type: Managed Medicaid /          06/25/20 1006   Discharge Assessment   Assessment Type Discharge Planning Assessment   Confirmed/corrected address and phone number on facesheet? No  (demario is homeless, originally from [place] but most recently from [place])   Assessment information obtained from? Patient   Communicated expected length of stay with patient/caregiver yes   Prior to hospitilization cognitive status: Alert/Oriented   Prior to hospitalization functional status: Independent   Current cognitive status: Alert/Oriented   Current Functional Status: Independent  (states that he has prescription for cane)   Facility Arrived From: [facility]   Lives With other (see comments)  (homeless)   Able to Return to Prior Arrangements   (TBD)   Is patient able to care for self after discharge? No   Who are your caregiver(s) and their phone number(s)? n/a  Stacy Gunn (mother) [phone]   Patient's perception of discharge disposition rehab facility   Patient currently being followed by outpatient case management? No   Patient currently receives any other outside agency services? No   Equipment Currently Used at Home none   Do you have any problems affording any of your prescribed medications? No   Is the patient taking medications as prescribed? yes   Does the patient have transportation home? Yes   Transportation Anticipated family or friend will provide   Discharge Plan A Psychiatric hospital   Discharge Plan B Shelter   DME Needed Upon Discharge  other (see comments)  (TBD)       Moi Acevedo RN MSN  Critical Care-   Ext. [phone]

## 2020-06-25 NOTE — ASSESSMENT & PLAN NOTE
- Per Pt he took 15-20 tylenol pills (500mg each) on 6/20 , then he states he took another 30 pills at  Midnight. Potentially he ingesting [id number]. Toxic level is >10g or >200mg/kg. There was also considerable delay in his presentation to the ER.   - At admission, his LFT's were   and , T. Bili 1, Tylenol level of 243.  - Given NAC x 20 hours, then continued at 6.25mg/Kg/hr since that time. AST/ALT have continued to increase. Now AST 6022 ALT 5631. INR 1.7. continue Vit k 10mg IV x3 days   - Continue NAC. Tylenol now < 3. Continue to trend CBC, CMP, Phos, INR q6h  - Hepatitis workup in process. F/u RUQ US.   - Liver TP consult.

## 2020-06-25 NOTE — HOSPITAL COURSE
6/21: presented to [facility] for intentional tylenol OD. Was admitted and started on NAC.   6/24: His transaminites continued to worsen and he was transferred to [facility] for liver TP eval

## 2020-06-25 NOTE — HPI
Dk Pereira is a 34 y.o. male with a h/o major depressive disorder w/o PF, Anorexia Nervosa, Borderline PD, homelessness,  and sacral and calcaneal fractures.  He has been followed by psychiatry closely for SI and attempts in the past ( x2).  He was recently discharged from a 10 day psychiatric inpatient stay in 3/12/20 for acute depression, and then admitted again to inpatient psychiatry on (4/16-4/23) for anorexia nervosa and acute depressive episode.  He had suicidal ideation on that stay documented as well. His most recent stay in a psychiatry unit was apparently at [facility] on 6/1-6/18 where he was again treated for depression, anxiety and anorexia nervosa.  He was discharged to a group home afterwards, then onto the streets.       The patient now presents to [facility] ED after intentional tylenol overdose.  He told the ED provider that he took 15-20 tylenol pills at midnight  then he states he took another 30 pills at  2am on 6/21.At admission, his LFT's were   and , T. Bili 1, Tylenol level of 243. Given NAC x 20 hours, then continued at 6.25mg/Kg/hr since that time. AST/ALT have continued to increase. Now AST 6022 ALT 5631. INR 1.7.  The patient has been on continuous acetylcysteine. Dr. Wells requesting transfer to facility with liver transplant capabilities.

## 2020-06-25 NOTE — ASSESSMENT & PLAN NOTE
- 2/2 sacral and calcaneal fractures.  - Resting comfortably at this time.   - if pain medication is necessary, use low dose opiates as they are hepatically metabolized

## 2020-06-25 NOTE — ASSESSMENT & PLAN NOTE
- Hx of SI with attempts in the past ( x2).  He was recently discharged from a 10 day psychiatric inpatient stay in 3/12/20 for acute depression, and then admitted again to inpatient psychiatry on (4/16-4/23) for anorexia nervosa and acute depressive episode.   - He will likely need inpatient treatment following discharge.   - Will consult psych

## 2020-06-25 NOTE — PLAN OF CARE
CMICU DAILY GOALS       A: Awake    RASS: Goal -    Actual -     Restraint necessity:    B: Breath   SBT: Not intubated   C: Coordinate A & B, analgesics/sedatives   Pain: managed    SAT: Not intubated  D: Delirium   CAM-ICU: Overall CAM-ICU: Negative  E: Early Mobility   MOVE Screen: Pass   Activity: Activity Management: activity adjusted per tolerance  FAS: Feeding/Nutrition   Diet order: Diet/Nutrition Received: NPO,   Fluid restriction:    T: Thrombus   DVT prophylaxis: VTE Required Core Measure: Pharmacological prophylaxis initiated/maintained  H: HOB Elevation   Head of Bed (HOB): HOB at 20-30 degrees  U: Ulcer Prophylaxis   GI: yes  G: Glucose control   managed    S: Skin   Bundle compliance: yes   Bathing/Skin Care: bath, chlorhexidine, bath, complete, dressed/undressed, incontinence care, linen changed Date: 6/25/20 @ 0500  B: Bowel Function   no issues   I: Indwelling Catheters   Posadas necessity:     CVC necessity: No   IPAD offered: No  D: De-escalation Antibx   No  Plan for the day   Monitor liver functions, liver tx workup  Family/Goals of care/Code Status   Code Status: Full Code     No acute events throughout day, VS and assessment per flow sheet, patient progressing towards goals as tolerated, plan of care reviewed with Dk Pereira and family, all concerns addressed, will continue to monitor.

## 2020-06-25 NOTE — HPI
Patient is a 34 year old male with extensive psychiatric history including major depressive disorder w/o PF, Anorexia Nervosa, and Borderline PD who presents to hospital as a transfer from [place] for intentional tylenol overdose; transferred for transplant capable facility. Briefly, patient has had 3 hospital stays for psychiatric issues over the last 3 months; two of which involved suicidal ideation. Shortly after the last stay, patient was discharged to a group home but eventually went back onto the streets as he is homeless. He reported taking roughly 50 tylenol pills, he is unsure of the dose, in the span of a few hours. His AST and ALT went from 320 and 226 to 6022 and 5631; tylenol level at admit was 243. He was treated with NAC for 20 hours and transferred with NAC continued and 6.25 mg/kg/hr.     At the time of my exam, patient has flat affect. He reports continued suicidal ideation. His mood fluctuates between feeling normal and severe depressive episodes. He also endorses nausea. He denies headache, dizziness, chest pain,abdominal pain, vomiting, diarrhea, fevers, or chills.

## 2020-06-25 NOTE — ASSESSMENT & PLAN NOTE
Dk Pereira is a 34 y.o. male with a h/o major depressive disorder w/o PF, Anorexia Nervosa, Borderline PD, homelessness,  and sacral and calcaneal fractures who was transferred to [facility] to be evaluated by liver transplant team.  Patient reports ongoing SI and hopelessness with acute stressor of the loss of his sister around mid June.  He has little social support here in [place] and has had two prior suicide attempts.  He remains high risk for SA.  Would continue PEC and contact  for CEC.  Will defer starting any psychotropic medications currently given notable transaminitis.     Rec:   - hold psychotropic medications for now  - PEC and contact  for CEC     Psychiatry will follow along.
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
## 2020-06-25 NOTE — SUBJECTIVE & OBJECTIVE
Review of Systems   Constitutional: Negative for activity change, chills and fever.   HENT: Negative for congestion and sore throat.    Respiratory: Negative for cough, chest tightness, shortness of breath and wheezing.    Cardiovascular: Negative for chest pain, palpitations and leg swelling.   Gastrointestinal: Positive for abdominal pain (mild, but improved since admit) and nausea. Negative for constipation, diarrhea and vomiting.   Genitourinary: Negative for dysuria and flank pain.   Musculoskeletal: Negative for arthralgias, back pain and myalgias.   Skin: Negative for color change and pallor.   Neurological: Negative for dizziness, weakness and headaches.   Psychiatric/Behavioral: Positive for dysphoric mood and suicidal ideas.       Past Medical History:   Diagnosis Date    Anemia     Anorexia nervosa     Anxiety     Major depressive disorder     PTSD (post-traumatic stress disorder)        Past Surgical History:   Procedure Laterality Date    BACK SURGERY      FRACTURE SURGERY         Family history of liver disease: No    Review of patient's allergies indicates:   Allergen Reactions    Pork/porcine containing products        Tobacco Use    Smoking status: Current Every Day Smoker    Smokeless tobacco: Never Used   Substance and Sexual Activity    Alcohol use: Never     Frequency: Never    Drug use: Never    Sexual activity: Not on file       Medications Prior to Admission   Medication Sig Dispense Refill Last Dose    busPIRone (BUSPAR) 10 MG tablet Take 1 tablet (10 mg total) by mouth 3 (three) times daily. 90 tablet 1 More than a month at Unknown time    lithium (ESKALITH) 300 MG capsule Take 1 capsule (300 mg total) by mouth 2 (two) times daily. 60 capsule 1 More than a month at Unknown time    mirtazapine (REMERON) 15 MG tablet Take 1 tablet (15 mg total) by mouth nightly. 30 tablet 1 More than a month at Unknown time    venlafaxine (EFFEXOR-XR) 75 MG 24 hr capsule Take 3 capsules (225  mg total) by mouth every morning. 90 capsule 1 More than a month at Unknown time       Objective:     Vital Signs (Most Recent):  Temp: 98.4 °F (36.9 °C) (06/25/20 1100)  Pulse: 83 (06/25/20 1100)  Resp: 20 (06/25/20 1100)  BP: 105/70 (06/25/20 1100)  SpO2: 97 % (06/25/20 1100) Vital Signs (24h Range):  Temp:  [97.6 °F (36.4 °C)-98.4 °F (36.9 °C)] 98.4 °F (36.9 °C)  Pulse:  [] 83  Resp:  [12-76] 20  SpO2:  [96 %-100 %] 97 %  BP: ()/(54-79) 105/70     Weight: 48.2 kg (106 lb 4.2 oz) (06/24/20 1845)  Body mass index is 17.15 kg/m².    Physical Exam  Constitutional:       Appearance: Normal appearance. He is well-developed. He is not toxic-appearing or diaphoretic.   HENT:      Head: Normocephalic and atraumatic.      Nose: Nose normal.   Eyes:      General: No scleral icterus.     Conjunctiva/sclera: Conjunctivae normal.   Neck:      Musculoskeletal: Normal range of motion and neck supple.   Cardiovascular:      Rate and Rhythm: Normal rate and regular rhythm.      Heart sounds: Normal heart sounds.   Pulmonary:      Effort: Pulmonary effort is normal.      Breath sounds: Normal breath sounds. No wheezing or rales.   Abdominal:      General: Bowel sounds are normal. There is no distension.      Palpations: Abdomen is soft.      Tenderness: There is abdominal tenderness (mild, diffuse).   Musculoskeletal: Normal range of motion.      Right lower leg: No edema.      Left lower leg: No edema.   Skin:     General: Skin is warm and dry.      Coloration: Skin is not jaundiced.   Neurological:      Mental Status: He is alert and oriented to person, place, and time.   Psychiatric:      Comments: Persistent suicidal thoughts         MELD-Na score: 11 at 6/25/2020 10:00 AM  MELD score: 11 at 6/25/2020 10:00 AM  Calculated from:  Serum Creatinine: 0.7 mg/dL (Rounded to 1 mg/dL) at 6/25/2020 10:00 AM  Serum Sodium: 135 mmol/L at 6/25/2020 10:00 AM  Total Bilirubin: 0.8 mg/dL (Rounded to 1 mg/dL) at 6/25/2020 10:00  AM  INR(ratio): 1.5 at 6/25/2020 10:00 AM  Age: 34 years 9 months    Significant Labs:  Labs within the past month have been reviewed.    Significant Imaging:  Reviewed

## 2020-06-26 VITALS
HEART RATE: 78 BPM | BODY MASS INDEX: 17.07 KG/M2 | RESPIRATION RATE: 18 BRPM | TEMPERATURE: 98 F | SYSTOLIC BLOOD PRESSURE: 103 MMHG | DIASTOLIC BLOOD PRESSURE: 68 MMHG | OXYGEN SATURATION: 100 % | HEIGHT: 66 IN | WEIGHT: 106.25 LBS

## 2020-06-26 PROBLEM — F32.9 MAJOR DEPRESSIVE DISORDER: Status: ACTIVE | Noted: 2020-06-26

## 2020-06-26 LAB
ALBUMIN SERPL BCP-MCNC: 3.5 G/DL (ref 3.5–5.2)
ALBUMIN SERPL BCP-MCNC: 3.7 G/DL (ref 3.5–5.2)
ALBUMIN SERPL BCP-MCNC: 3.8 G/DL (ref 3.5–5.2)
ALBUMIN SERPL BCP-MCNC: 3.8 G/DL (ref 3.5–5.2)
ALP SERPL-CCNC: 115 U/L (ref 55–135)
ALP SERPL-CCNC: 126 U/L (ref 55–135)
ALP SERPL-CCNC: 130 U/L (ref 55–135)
ALP SERPL-CCNC: 131 U/L (ref 55–135)
ALT SERPL W/O P-5'-P-CCNC: 2786 U/L (ref 10–44)
ALT SERPL W/O P-5'-P-CCNC: 2964 U/L (ref 10–44)
ALT SERPL W/O P-5'-P-CCNC: 2987 U/L (ref 10–44)
ALT SERPL W/O P-5'-P-CCNC: 3022 U/L (ref 10–44)
ANION GAP SERPL CALC-SCNC: 10 MMOL/L (ref 8–16)
ANION GAP SERPL CALC-SCNC: 12 MMOL/L (ref 8–16)
ANION GAP SERPL CALC-SCNC: 9 MMOL/L (ref 8–16)
APAP SERPL-MCNC: <3 UG/ML (ref 10–20)
AST SERPL-CCNC: 1082 U/L (ref 10–40)
AST SERPL-CCNC: 1099 U/L (ref 10–40)
AST SERPL-CCNC: 1110 U/L (ref 10–40)
AST SERPL-CCNC: 965 U/L (ref 10–40)
BASOPHILS # BLD AUTO: 0.04 K/UL (ref 0–0.2)
BASOPHILS # BLD AUTO: 0.04 K/UL (ref 0–0.2)
BASOPHILS # BLD AUTO: 0.05 K/UL (ref 0–0.2)
BASOPHILS NFR BLD: 1.1 % (ref 0–1.9)
BILIRUB DIRECT SERPL-MCNC: 0.4 MG/DL (ref 0.1–0.3)
BILIRUB SERPL-MCNC: 0.6 MG/DL (ref 0.1–1)
BILIRUB SERPL-MCNC: 0.6 MG/DL (ref 0.1–1)
BILIRUB SERPL-MCNC: 0.7 MG/DL (ref 0.1–1)
BILIRUB SERPL-MCNC: 0.7 MG/DL (ref 0.1–1)
BUN SERPL-MCNC: 8 MG/DL (ref 6–20)
BUN SERPL-MCNC: 9 MG/DL (ref 6–20)
BUN SERPL-MCNC: 9 MG/DL (ref 6–20)
CALCIUM SERPL-MCNC: 9.2 MG/DL (ref 8.7–10.5)
CALCIUM SERPL-MCNC: 9.3 MG/DL (ref 8.7–10.5)
CALCIUM SERPL-MCNC: 9.4 MG/DL (ref 8.7–10.5)
CHLORIDE SERPL-SCNC: 105 MMOL/L (ref 95–110)
CMV DNA SERPL NAA+PROBE-ACNC: NORMAL IU/ML
CO2 SERPL-SCNC: 22 MMOL/L (ref 23–29)
CO2 SERPL-SCNC: 24 MMOL/L (ref 23–29)
CO2 SERPL-SCNC: 24 MMOL/L (ref 23–29)
CREAT SERPL-MCNC: 0.6 MG/DL (ref 0.5–1.4)
CREAT SERPL-MCNC: 0.7 MG/DL (ref 0.5–1.4)
CREAT SERPL-MCNC: 0.7 MG/DL (ref 0.5–1.4)
DIFFERENTIAL METHOD: ABNORMAL
EOSINOPHIL # BLD AUTO: 0.1 K/UL (ref 0–0.5)
EOSINOPHIL NFR BLD: 3.1 % (ref 0–8)
EOSINOPHIL NFR BLD: 3.2 % (ref 0–8)
EOSINOPHIL NFR BLD: 3.7 % (ref 0–8)
ERYTHROCYTE [DISTWIDTH] IN BLOOD BY AUTOMATED COUNT: 13 % (ref 11.5–14.5)
ERYTHROCYTE [DISTWIDTH] IN BLOOD BY AUTOMATED COUNT: 13.1 % (ref 11.5–14.5)
ERYTHROCYTE [DISTWIDTH] IN BLOOD BY AUTOMATED COUNT: 13.1 % (ref 11.5–14.5)
EST. GFR  (AFRICAN AMERICAN): >60 ML/MIN/1.73 M^2
EST. GFR  (NON AFRICAN AMERICAN): >60 ML/MIN/1.73 M^2
GLUCOSE SERPL-MCNC: 101 MG/DL (ref 70–110)
GLUCOSE SERPL-MCNC: 126 MG/DL (ref 70–110)
GLUCOSE SERPL-MCNC: 84 MG/DL (ref 70–110)
HCT VFR BLD AUTO: 33.3 % (ref 40–54)
HCT VFR BLD AUTO: 34 % (ref 40–54)
HCT VFR BLD AUTO: 34.2 % (ref 40–54)
HGB BLD-MCNC: 10.7 G/DL (ref 14–18)
HGB BLD-MCNC: 11 G/DL (ref 14–18)
HGB BLD-MCNC: 11.3 G/DL (ref 14–18)
IMM GRANULOCYTES # BLD AUTO: 0.01 K/UL (ref 0–0.04)
IMM GRANULOCYTES # BLD AUTO: 0.02 K/UL (ref 0–0.04)
IMM GRANULOCYTES # BLD AUTO: 0.02 K/UL (ref 0–0.04)
IMM GRANULOCYTES NFR BLD AUTO: 0.3 % (ref 0–0.5)
IMM GRANULOCYTES NFR BLD AUTO: 0.4 % (ref 0–0.5)
IMM GRANULOCYTES NFR BLD AUTO: 0.6 % (ref 0–0.5)
INR PPP: 1.1 (ref 0.8–1.2)
INR PPP: 1.2 (ref 0.8–1.2)
INR PPP: 1.2 (ref 0.8–1.2)
LYMPHOCYTES # BLD AUTO: 1 K/UL (ref 1–4.8)
LYMPHOCYTES # BLD AUTO: 1.2 K/UL (ref 1–4.8)
LYMPHOCYTES # BLD AUTO: 1.4 K/UL (ref 1–4.8)
LYMPHOCYTES NFR BLD: 25.9 % (ref 18–48)
LYMPHOCYTES NFR BLD: 27 % (ref 18–48)
LYMPHOCYTES NFR BLD: 38.2 % (ref 18–48)
MAGNESIUM SERPL-MCNC: 2 MG/DL (ref 1.6–2.6)
MCH RBC QN AUTO: 28.4 PG (ref 27–31)
MCH RBC QN AUTO: 28.5 PG (ref 27–31)
MCH RBC QN AUTO: 28.6 PG (ref 27–31)
MCHC RBC AUTO-ENTMCNC: 32.1 G/DL (ref 32–36)
MCHC RBC AUTO-ENTMCNC: 32.4 G/DL (ref 32–36)
MCHC RBC AUTO-ENTMCNC: 33 G/DL (ref 32–36)
MCV RBC AUTO: 87 FL (ref 82–98)
MCV RBC AUTO: 88 FL (ref 82–98)
MCV RBC AUTO: 88 FL (ref 82–98)
MONOCYTES # BLD AUTO: 0.3 K/UL (ref 0.3–1)
MONOCYTES # BLD AUTO: 0.3 K/UL (ref 0.3–1)
MONOCYTES # BLD AUTO: 0.5 K/UL (ref 0.3–1)
MONOCYTES NFR BLD: 10.8 % (ref 4–15)
MONOCYTES NFR BLD: 9.1 % (ref 4–15)
MONOCYTES NFR BLD: 9.2 % (ref 4–15)
NEUTROPHILS # BLD AUTO: 1.7 K/UL (ref 1.8–7.7)
NEUTROPHILS # BLD AUTO: 2.2 K/UL (ref 1.8–7.7)
NEUTROPHILS # BLD AUTO: 2.6 K/UL (ref 1.8–7.7)
NEUTROPHILS NFR BLD: 47.3 % (ref 38–73)
NEUTROPHILS NFR BLD: 57.6 % (ref 38–73)
NEUTROPHILS NFR BLD: 60.3 % (ref 38–73)
NRBC BLD-RTO: 0 /100 WBC
PHOSPHATE SERPL-MCNC: 3.2 MG/DL (ref 2.7–4.5)
PHOSPHATE SERPL-MCNC: 4.1 MG/DL (ref 2.7–4.5)
PHOSPHATE SERPL-MCNC: 4.6 MG/DL (ref 2.7–4.5)
PLATELET # BLD AUTO: 198 K/UL (ref 150–350)
PLATELET # BLD AUTO: 201 K/UL (ref 150–350)
PLATELET # BLD AUTO: 207 K/UL (ref 150–350)
PMV BLD AUTO: 10 FL (ref 9.2–12.9)
PMV BLD AUTO: 10.3 FL (ref 9.2–12.9)
PMV BLD AUTO: 9.9 FL (ref 9.2–12.9)
POTASSIUM SERPL-SCNC: 3.9 MMOL/L (ref 3.5–5.1)
POTASSIUM SERPL-SCNC: 4 MMOL/L (ref 3.5–5.1)
POTASSIUM SERPL-SCNC: 4.1 MMOL/L (ref 3.5–5.1)
PROT SERPL-MCNC: 6.7 G/DL (ref 6–8.4)
PROT SERPL-MCNC: 6.8 G/DL (ref 6–8.4)
PROT SERPL-MCNC: 7 G/DL (ref 6–8.4)
PROT SERPL-MCNC: 7 G/DL (ref 6–8.4)
PROTHROMBIN TIME: 11.7 SEC (ref 9–12.5)
PROTHROMBIN TIME: 11.7 SEC (ref 9–12.5)
PROTHROMBIN TIME: 11.8 SEC (ref 9–12.5)
RBC # BLD AUTO: 3.77 M/UL (ref 4.6–6.2)
RBC # BLD AUTO: 3.86 M/UL (ref 4.6–6.2)
RBC # BLD AUTO: 3.95 M/UL (ref 4.6–6.2)
SODIUM SERPL-SCNC: 138 MMOL/L (ref 136–145)
SODIUM SERPL-SCNC: 139 MMOL/L (ref 136–145)
SODIUM SERPL-SCNC: 139 MMOL/L (ref 136–145)
WBC # BLD AUTO: 3.53 K/UL (ref 3.9–12.7)
WBC # BLD AUTO: 3.7 K/UL (ref 3.9–12.7)
WBC # BLD AUTO: 4.45 K/UL (ref 3.9–12.7)

## 2020-06-26 PROCEDURE — 94761 N-INVAS EAR/PLS OXIMETRY MLT: CPT

## 2020-06-26 PROCEDURE — 84100 ASSAY OF PHOSPHORUS: CPT

## 2020-06-26 PROCEDURE — 25000003 PHARM REV CODE 250: Performed by: STUDENT IN AN ORGANIZED HEALTH CARE EDUCATION/TRAINING PROGRAM

## 2020-06-26 PROCEDURE — 63600175 PHARM REV CODE 636 W HCPCS: Performed by: INTERNAL MEDICINE

## 2020-06-26 PROCEDURE — 99238 PR HOSPITAL DISCHARGE DAY,<30 MIN: ICD-10-PCS | Mod: ,,, | Performed by: HOSPITALIST

## 2020-06-26 PROCEDURE — 85610 PROTHROMBIN TIME: CPT

## 2020-06-26 PROCEDURE — 85025 COMPLETE CBC W/AUTO DIFF WBC: CPT | Mod: 91

## 2020-06-26 PROCEDURE — 83735 ASSAY OF MAGNESIUM: CPT

## 2020-06-26 PROCEDURE — 99238 HOSP IP/OBS DSCHRG MGMT 30/<: CPT | Mod: ,,, | Performed by: HOSPITALIST

## 2020-06-26 PROCEDURE — 63600175 PHARM REV CODE 636 W HCPCS: Performed by: STUDENT IN AN ORGANIZED HEALTH CARE EDUCATION/TRAINING PROGRAM

## 2020-06-26 PROCEDURE — 80076 HEPATIC FUNCTION PANEL: CPT

## 2020-06-26 PROCEDURE — 36415 COLL VENOUS BLD VENIPUNCTURE: CPT

## 2020-06-26 PROCEDURE — 25000003 PHARM REV CODE 250: Performed by: INTERNAL MEDICINE

## 2020-06-26 PROCEDURE — 80053 COMPREHEN METABOLIC PANEL: CPT | Mod: 91

## 2020-06-26 PROCEDURE — 80329 ANALGESICS NON-OPIOID 1 OR 2: CPT

## 2020-06-26 RX ADMIN — CEFTRIAXONE 2 G: 2 INJECTION, SOLUTION INTRAVENOUS at 12:06

## 2020-06-26 RX ADMIN — ONDANSETRON 4 MG: 2 INJECTION, SOLUTION INTRAMUSCULAR; INTRAVENOUS at 12:06

## 2020-06-26 RX ADMIN — PHYTONADIONE 10 MG: 10 INJECTION, EMULSION INTRAMUSCULAR; INTRAVENOUS; SUBCUTANEOUS at 10:06

## 2020-06-26 RX ADMIN — PANTOPRAZOLE SODIUM 40 MG: 40 TABLET, DELAYED RELEASE ORAL at 10:06

## 2020-06-26 NOTE — HOSPITAL COURSE
Admitted for Tylenol OD in suicide attempt. Found to have acute liver injury, received NAC gtt. LFT's peaked. Hepatology referral placed. Psych consulted and recommend transfer to inpatient psychiatric facility for further management.

## 2020-06-26 NOTE — PLAN OF CARE
CM received notice that patient is medically ready to go to Psych facility.  ADT - 32 order placed for placement.

## 2020-06-26 NOTE — PLAN OF CARE
AGUSTIN received a call from transfer center working on patient's psych placement stating that facility reviewing the patient is still concerned about his liver functions and was wondering if the labs ordered at 10:05  Will be drawn.  AGUSTIN called patient's nurse, Spencer, to see if they are drawing the labs.  She stated she will call about the labs to be drawn.

## 2020-06-26 NOTE — CARE UPDATE
Patient reviewed by hepatology team. AST and ALT continue to trend down. INR normalized at 1.1. Can discontinue NAC. Patient stable from hepatology standpoint for psychiatric placement. Please contact hepatology with any further questions or concerns.

## 2020-06-26 NOTE — CONSULTS
Wound care consult received. Pt with resorbing blood blister to R plantar foot.  Media file not available. Pt with history of calcaneal fractures and has been homeless.  Pt reports this blister appeared after wearing ill-fitting flip flops. As blister is resolving, no treatment needed at this time. Notified nursing. Wound care team to sign off. Please reconsult for any further needs. [id number]

## 2020-06-26 NOTE — PLAN OF CARE
Problem: Adult Inpatient Plan of Care  Goal: Plan of Care Review  Outcome: Ongoing, Progressing     Patient aaox4. Vitals wnl. Cardiac monitoring in place. Neuro checks q4hrs. Acetylcysteine drip infusing. Minimal pain to sacrum, lidocaine patch applied. Patient able to make needs known to staff. Sitter at bedside, room cleared of potentially hazard items per PEC/CEC protocol. Monitoring.

## 2020-06-26 NOTE — HPI
Dk Pereira is a 34 y.o. male with a h/o major depressive disorder w/o PF, Anorexia Nervosa, Borderline PD, homelessness,  and sacral and calcaneal fractures.  He has been followed by psychiatry closely for SI and attempts in the past ( x2).  He was recently discharged from a 10 day psychiatric inpatient stay in 3/12/20 for acute depression, and then admitted again to inpatient psychiatry on (4/16-4/23) for anorexia nervosa and acute depressive episode.  He had suicidal ideation on that stay documented as well. His most recent stay in a psychiatry unit was apparently at [facility] on 6/1-6/18 where he was again treated for depression, anxiety and anorexia nervosa.  He was discharged to a group home afterwards, then onto the streets.       The patient now presents to [facility] ED after intentional tylenol overdose.  He told the ED provider that he took 15-20 tylenol pills at midnight  then he states he took another 30 pills at  2am on 6/21.At admission, his LFT's were   and , T. Bili 1, Tylenol level of 243. Given NAC x 20 hours, then continued at 6.25mg/Kg/hr since that time. AST/ALT have continued to increase. Now AST 6022 ALT 5631. INR 1.7.  The patient has been on continuous acetylcysteine. Dr. Wells requesting transfer to facility with liver transplant capabilities.

## 2020-06-26 NOTE — SUBJECTIVE & OBJECTIVE
"Subjective:   On face to face evaluation the patient reports that he slept well overnight.  States he does have an appetite and is eating, but does have some nausea.  Discussed asking RN for PRN medications.  Denies SI/HI/AVH today.        Family History     None        Tobacco Use    Smoking status: Current Every Day Smoker    Smokeless tobacco: Never Used   Substance and Sexual Activity    Alcohol use: Never     Frequency: Never    Drug use: Never    Sexual activity: Not on file     Psychotherapeutics (From admission, onward)    None           Review of Systems  Objective:     Vital Signs (Most Recent):  Temp: 98.4 °F (36.9 °C) (06/26/20 1000)  Pulse: 97 (06/26/20 1000)  Resp: 18 (06/26/20 1000)  BP: 120/82 (06/26/20 1000)  SpO2: 99 % (06/26/20 1000) Vital Signs (24h Range):  Temp:  [97.4 °F (36.3 °C)-98.5 °F (36.9 °C)] 98.4 °F (36.9 °C)  Pulse:  [64-97] 97  Resp:  [16-27] 18  SpO2:  [94 %-100 %] 99 %  BP: ()/(51-83) 120/82     Height: 5' 6" (167.6 cm)  Weight: 48.2 kg (106 lb 4.2 oz)  Body mass index is 17.15 kg/m².      Intake/Output Summary (Last 24 hours) at 6/26/2020 1041  Last data filed at 6/26/2020 0700  Gross per 24 hour   Intake 340 ml   Output 600 ml   Net -260 ml       Physical Exam  Psychiatric:      Comments: Appearance: Appears stated age, adequately groomed  Behavior: calm and cooperative with interview, decreased eye contact  Motor: no PMA/PMR, no tics or tremors  Speech: normal rate, normal volume, normal quantity  Mood: "ok"  Affect: mood congruent, nervous smile   Thought Content: -SI, -HI, no delusional content elicited   Thought Process: linear, and goal directed  Perceptual Disturbances: -AH, -VH, not actively responding to internal stimuli, does not appear internally preoccupied   Orientation: A&Ox4  Memory: intact recent, intact remote  Attention: did not assess [facility]  Insight: fair  Judgement: limited          Significant Labs: All pertinent labs within the past 24 hours " have been reviewed.    Significant Imaging: I have reviewed all pertinent imaging results/findings within the past 24 hours.

## 2020-06-26 NOTE — PROGRESS NOTES
[facility]  Psychiatry  Progress Note    Patient Name: Dk Pereira  MRN: [id number]   Code Status: Full Code  Admission Date: 6/24/2020  Hospital Length of Stay: 2 days  Expected Discharge Date: 6/29/2020  Attending Physician: Whit Sherwood MD  Primary Care Provider: Primary Doctor No    Current Legal Status: [facility]    Patient information was obtained from patient, past medical records and ER records.     Subjective:     Principal Problem:Suicide attempt by acetaminophen overdose    HPI: History of Present Illness:   Dk Pereira is a 34 y.o. male with a h/o major depressive disorder w/o PF, Anorexia Nervosa, Borderline PD, homelessness,  and sacral and calcaneal fractures.  He has been followed by psychiatry closely for SI and attempts in the past ( x2).  He was recently discharged from a 10 day psychiatric inpatient stay in 3/12/20 for acute depression, and then admitted again to inpatient psychiatry on (4/16-4/23) for anorexia nervosa and acute depressive episode.  He had suicidal ideation on that stay documented as well. His most recent stay in a psychiatry unit was apparently at [facility] on 6/1-6/18 where he was again treated for depression, anxiety and anorexia nervosa.  He was discharged to a group home afterwards, then onto the streets.       The patient now presents to [facility] ED after intentional tylenol overdose.  He told the ED provider that he took 15-20 tylenol pills at midnight  then he states he took another 30 pills at  2am on 6/21.At admission, his LFT's were   and , T. Bili 1, Tylenol level of 243. Given NAC x 20 hours, then continued at 6.25mg/Kg/hr since that time. AST/ALT have continued to increase. Now AST 6022 ALT 5631. INR 1.7.  The patient has been on continuous acetylcysteine. Dr. Wells requesting transfer to facility with liver transplant capabilities.      Overview/Hospital Course:  6/21: presented to [facility]  for intentional tylenol OD. Was admitted and started on NAC.   6/24: His transaminites continued to worsen and he was transferred to [facility] for liver TP eval     Subjective:   Patient confirmed above history.  States that he moved from [place] to [place] about 1.5 years ago to get away from family trauma.  He has been in and out of inpatient psychiatry.  Reports that his eating disorder started when he was a teen.  He gets upset when his weight is stable or if he gains weight.  He reports feeling hopeless, helpless, and worthless.  States that he was to go to a group home, but ended up taking Tylenol beforehand when he was behind a gas station.  He ended up calling 911 when he was throwing up bile.  He desires inpatient treatment for eating disorders.  Discussed that there are extremely limited resources for such treatment while trying to instill hope for realistic goals of treatment, including outpatient treatment for eating disorders, once this acute psychiatric crisis is over.  He does not express remorse about the suicide attempt being unsuccessful.  Reports ongoing SI.  Denies HI/AVH.  Cites an acute stressor of his sister dying of pneumonia a few weeks ago.  Has limited support here in [place].       Collateral:  Mother, Stacy Gunn, [phone]        Psychiatric Review Of Systems - Is patient experiencing or having changes in:  As noted above     Psychiatric History:  Previous diagnoses: MDD r/s w/o PF, Anorexia Nervosa, Borderline PD  Previous suicide attempts: multiple, OD, jumped off bridge (had to get surgical Tx)  Medication Trials: remeron, effexor, prozac, lithium, cymbalta, gabapentin, Abilify (Hx of akathisia)  Previous psychiatric hospitalizations:   - PCU w/ transfer to [facility] 10 days PTA  - 10 psych D/C 03/12/2020, see above for details  - D/c'd from [facility] on 4/24/20:                 4/17/20 - changed Lithium to 300 mg PO BID for adjunct in Tx-resistant MDD w/  "suicidality and s/p multiple suicide attempts; changed Effexor                    dosing to 225 mg PO QAM.                4/21- started Buspar 10 mg TID  -His most recent stay in a psychiatry unit was apparently at [facility] on 6/1-6/18 where he was again treated for depression, anxiety and anorexia nervosa.  He was discharged to a group home afterwards, then onto the [facility]    Hospital Course: LFTs continue to downtrend.     Subjective:   On face to face evaluation the patient reports that he slept well overnight.  States he does have an appetite and is eating, but does have some nausea.  Discussed asking RN for PRN medications.  Denies SI/HI/AVH today.        Family History     None        Tobacco Use    Smoking status: Current Every Day Smoker    Smokeless tobacco: Never Used   Substance and Sexual Activity    Alcohol use: Never     Frequency: Never    Drug use: Never    Sexual activity: Not on file     Psychotherapeutics (From admission, onward)    None           Review of Systems  Objective:     Vital Signs (Most Recent):  Temp: 98.4 °F (36.9 °C) (06/26/20 1000)  Pulse: 97 (06/26/20 1000)  Resp: 18 (06/26/20 1000)  BP: 120/82 (06/26/20 1000)  SpO2: 99 % (06/26/20 1000) Vital Signs (24h Range):  Temp:  [97.4 °F (36.3 °C)-98.5 °F (36.9 °C)] 98.4 °F (36.9 °C)  Pulse:  [64-97] 97  Resp:  [16-27] 18  SpO2:  [94 %-100 %] 99 %  BP: ()/(51-83) 120/82     Height: 5' 6" (167.6 cm)  Weight: 48.2 kg (106 lb 4.2 oz)  Body mass index is 17.15 kg/m².      Intake/Output Summary (Last 24 hours) at 6/26/2020 1041  Last data filed at 6/26/2020 0700  Gross per 24 hour   Intake 340 ml   Output 600 ml   Net -260 ml       Physical Exam  Psychiatric:      Comments: Appearance: Appears stated age, adequately groomed  Behavior: calm and cooperative with interview, decreased eye contact  Motor: no PMA/PMR, no tics or tremors  Speech: normal rate, normal volume, normal quantity  Mood: "ok"  Affect: mood congruent, " nervous smile   Thought Content: -SI, -HI, no delusional content elicited   Thought Process: linear, and goal directed  Perceptual Disturbances: -AH, -VH, not actively responding to internal stimuli, does not appear internally preoccupied   Orientation: A&Ox4  Memory: intact recent, intact remote  Attention: did not assess YARI  Insight: fair  Judgement: limited          Significant Labs: All pertinent labs within the past 24 hours have been reviewed.    Significant Imaging: I have reviewed all pertinent imaging results/findings within the past 24 hours.    Assessment/Plan:     * Suicide attempt by acetaminophen overdose  Dk Pereira is a 34 y.o. male with a h/o major depressive disorder w/o PF, Anorexia Nervosa, Borderline PD, homelessness,  and sacral and calcaneal fractures who was transferred to [facility] from [facility] to be evaluated by liver transplant team.  Patient reports ongoing SI and hopelessness with acute stressor of the loss of his sister around mid June.  He has little social support here in [place] and has had two prior suicide attempts.  He remains high risk for SA.  Would continue PEC/CEC.  Will defer starting any psychotropic medications currently given notable transaminitis.     Rec:   - hold psychotropic medications for now  - Continue PEC/CEC and transfer to inpatient psychiatry once medically cleared    Psychiatry will follow along.           Trudi Junior MD   Psychiatry  [facility]

## 2020-06-26 NOTE — ASSESSMENT & PLAN NOTE
Dk Pereira is a 34 y.o. male with a h/o major depressive disorder w/o PF, Anorexia Nervosa, Borderline PD, homelessness,  and sacral and calcaneal fractures who was transferred to [facility] to be evaluated by liver transplant team.  Patient reports ongoing SI and hopelessness with acute stressor of the loss of his sister around mid June.  He has little social support here in [place] and has had two prior suicide attempts.  He remains high risk for SA.  Would continue PEC/CEC.  Will defer starting any psychotropic medications currently given notable transaminitis.     Rec:   - hold psychotropic medications for now  - Continue PEC/CEC and transfer to inpatient psychiatry once medically cleared    Psychiatry will follow along.

## 2020-06-26 NOTE — DISCHARGE SUMMARY
[facility] Hospital Medicine  Discharge Summary      Patient Name: Dk Pereira  MRN: [id number]  Admission Date: 6/24/2020  Hospital Length of Stay: 2 days  Discharge Date and Time:  06/26/2020 3:03 PM  Attending Physician: Whit Sherwood MD   Discharging Provider: Whit Sherwood MD  Primary Care Provider: Primary Doctor [facility] Medicine Team: [facility]OS[facility] MED A Whit Sherwood MD    HPI:   Dk Pereira is a 34 y.o. male with a h/o major depressive disorder w/o PF, Anorexia Nervosa, Borderline PD, homelessness,  and sacral and calcaneal fractures.  He has been followed by psychiatry closely for SI and attempts in the past ( x2).  He was recently discharged from a 10 day psychiatric inpatient stay in 3/12/20 for acute depression, and then admitted again to inpatient psychiatry on (4/16-4/23) for anorexia nervosa and acute depressive episode.  He had suicidal ideation on that stay documented as well. His most recent stay in a psychiatry unit was apparently at [facility] on 6/1-6/18 where he was again treated for depression, anxiety and anorexia nervosa.  He was discharged to a group home afterwards, then onto the streets.       The patient now presents to [facility] ED after intentional tylenol overdose.  He told the ED provider that he took 15-20 tylenol pills at midnight  then he states he took another 30 pills at  2am on 6/21.At admission, his LFT's were   and , T. Bili 1, Tylenol level of 243. Given NAC x 20 hours, then continued at 6.25mg/Kg/hr since that time. AST/ALT have continued to increase. Now AST 6022 ALT 5631. INR 1.7.  The patient has been on continuous acetylcysteine. Dr. Wells requesting transfer to facility with liver transplant capabilities.     * No surgery found *      Hospital Course:   Admitted for Tylenol OD in suicide attempt. Found to have acute liver injury, received NAC gtt. LFT's peaked. Hepatology referral placed. Psych  consulted and recommend transfer to inpatient psychiatric facility for further management.      Consults:   Consults (From admission, onward)        Status Ordering Provider     Inpatient consult to Hepatology  Once     Provider:  (Not yet assigned)    Completed ODILON SULTANA     Inpatient consult to Liver Transplant Surgery  Once     Provider:  (Not yet assigned)    Acknowledged GINA RASMUSSEN     Inpatient consult to Psychiatry  Once     Provider:  (Not yet assigned)    Completed MINOR SOLORIO          No new Assessment & Plan notes have been filed under this hospital service since the last note was generated.  Service: Hospital Medicine    Final Active Diagnoses:    Diagnosis Date Noted POA    PRINCIPAL PROBLEM:  Suicide attempt by acetaminophen overdose [T39.1X2A] 06/25/2020 Yes    Tylenol overdose [T39.1X1A] 06/24/2020 Yes    Anxiety [F41.9] 04/21/2020 Yes    Generalized anxiety disorder [F41.1]  Yes    Depression [F32.9] 04/16/2020 Yes    Borderline personality disorder [F60.3] 03/11/2020 Yes    Major depressive disorder, recurrent episode, moderate [F33.1]  Yes    Chronic pain [G89.29] 03/08/2020 Yes      Problems Resolved During this Admission:       Discharged Condition: stable    Disposition: Home or Self Care    Follow Up:  Follow-up Information     Primary Doctor No.               Patient Instructions:      Ambulatory referral/consult to Hepatology   Standing Status: Future   Referral Priority: Routine Referral Type: Consultation   Referral Reason: Specialty Services Required   Requested Specialty: Hepatology   Number of Visits Requested: 1       Significant Diagnostic Studies: Labs:   CMP   Recent Labs   Lab 06/26/20  0340 06/26/20  0825 06/26/20  1226    138 139   K 4.0 4.1 3.9    105 105   CO2 22* 24 24   * 84 101   BUN 8 9 9   CREATININE 0.6 0.7 0.7   CALCIUM 9.3 9.4 9.2   PROT 6.8  6.7 7.0 7.0   ALBUMIN 3.7  3.5 3.8 3.8   BILITOT 0.6  0.6 0.7 0.7   ALKPHOS 126  115  131 130   AST 1,110*  1,082* 1,099* 965*   ALT 2,964*  2,987* 3,022* 2,786*   ANIONGAP 12 9 10   ESTGFRAFRICA >60.0 >60.0 >60.0   EGFRNONAA >60.0 >60.0 >60.0    and CBC   Recent Labs   Lab 06/26/20  0340 06/26/20  0825 06/26/20  1226   WBC 3.53* 3.70* 4.45   HGB 10.7* 11.3* 11.0*   HCT 33.3* 34.2* 34.0*    198 207       Pending Diagnostic Studies:     Procedure Component Value Units Date/Time    CMV DNA, quantitative, PCR [[id number]] Collected: 06/24/20 2247    Order Status: Sent Lab Status: In process Updated: 06/24/20 2312    Specimen: Blood     Copper, Serum [[id number]] Collected: 06/24/20 2247    Order Status: Sent Lab Status: In process Updated: 06/24/20 2315    Specimen: Blood     EKG 12-lead [[id number]]     Order Status: Sent Lab Status: No result     Benita-Barr virus DNA, quantitative [[id number]] Collected: 06/24/20 2247    Order Status: Sent Lab Status: In process Updated: 06/24/20 2312    Specimen: Blood     HEPATITIS B VIRAL DNA, QUANTITATIVE [[id number]] Collected: 06/24/20 2247    Order Status: Sent Lab Status: In process Updated: 06/24/20 2315    Specimen: Blood     Hcv Rna Qualitative [[id number]] Collected: 06/24/20 2247    Order Status: Sent Lab Status: In process Updated: 06/24/20 2315    Specimen: Blood     Herpes simplex Virus (HSV) Type 1 & 2 DNA by PCR [[id number]] Collected: 06/24/20 2247    Order Status: Sent Lab Status: In process Updated: 06/24/20 2312    Specimen: Blood     Phosphatidylethanol (PETH) [[id number]] Collected: 06/24/20 2247    Order Status: Sent Lab Status: In process Updated: 06/24/20 2302    Specimen: Blood          Medications:  Reconciled Home Medications:      Medication List      STOP taking these medications    busPIRone 10 MG tablet  Commonly known as: BUSPAR     lithium 300 MG capsule  Commonly known as: ESKALITH     mirtazapine 15 MG tablet  Commonly known as: REMERON     venlafaxine 75 MG 24 hr capsule  Commonly known as: EFFEXOR-XR            Indwelling  Lines/Drains at time of discharge:   Lines/Drains/Airways     None                 Time spent on the discharge of patient: 29 minutes  Patient was seen and examined on the date of discharge and determined to be suitable for discharge.         Whit Sherwood MD  Department of Hospital Medicine  [facility]

## 2020-06-26 NOTE — NURSING
Discharge papers given to [facility]'s. Gi sent pt info facility. Pt belongings given to [facility]'s transport. IVs and tele removed. Pt. Free of falls. V/S were stable. Gave report to ASHLEY Eisenberg.

## 2020-06-27 LAB
HSV-1 DNA BY PCR: NEGATIVE
HSV-2 DNA BY PCR: NEGATIVE

## 2020-06-29 LAB
COPPER SERPL-MCNC: 832 UG/L (ref 665–1480)
HBV DNA SERPL NAA+PROBE-ACNC: <10 IU/ML
HBV DNA SERPL NAA+PROBE-LOG IU: <1 LOG (10) IU/ML
HBV DNA SERPL QL NAA+PROBE: NOT DETECTED
HCV RNA SERPL QL NAA+PROBE: NOT DETECTED

## 2020-06-30 LAB
BACTERIA BLD CULT: NORMAL
BACTERIA BLD CULT: NORMAL

## 2020-07-02 LAB — PHOSPHATIDYLETHANOL (PETH): NEGATIVE NG/ML

## 2020-07-06 PROBLEM — F43.10 PTSD (POST-TRAUMATIC STRESS DISORDER): Status: ACTIVE | Noted: 2020-07-06

## 2020-07-06 PROBLEM — K71.9: Status: ACTIVE | Noted: 2020-07-06

## 2020-07-10 PROBLEM — R45.851 SUICIDAL IDEATIONS: Status: ACTIVE | Noted: 2020-07-10

## 2020-07-11 PROBLEM — B86 SCABIES: Status: ACTIVE | Noted: 2020-07-11

## 2020-07-12 LAB — EBV DNA BY PCR: NORMAL IU/ML

## 2020-08-18 ENCOUNTER — HOSPITAL ENCOUNTER (EMERGENCY)
Facility: HOSPITAL | Age: 35
Discharge: PSYCHIATRIC OR SUBSTANCE USE FACILITY | End: 2020-08-19
Attending: FAMILY MEDICINE
Payer: MEDICAID

## 2020-08-18 DIAGNOSIS — R45.851 SUICIDAL IDEATION: Primary | ICD-10-CM

## 2020-08-18 LAB
ALBUMIN SERPL BCP-MCNC: 4.2 G/DL (ref 3.5–5.2)
ALP SERPL-CCNC: 82 U/L (ref 55–135)
ALT SERPL W/O P-5'-P-CCNC: 8 U/L (ref 10–44)
AMPHET+METHAMPHET UR QL: NEGATIVE
ANION GAP SERPL CALC-SCNC: 9 MMOL/L (ref 8–16)
APAP SERPL-MCNC: <3 UG/ML (ref 10–20)
AST SERPL-CCNC: 23 U/L (ref 10–40)
BARBITURATES UR QL SCN>200 NG/ML: NEGATIVE
BASOPHILS # BLD AUTO: 0.05 K/UL (ref 0–0.2)
BASOPHILS NFR BLD: 1.3 % (ref 0–1.9)
BENZODIAZ UR QL SCN>200 NG/ML: NEGATIVE
BILIRUB SERPL-MCNC: 0.6 MG/DL (ref 0.1–1)
BILIRUB UR QL STRIP: NEGATIVE
BUN SERPL-MCNC: 13 MG/DL (ref 6–20)
BZE UR QL SCN: NEGATIVE
CALCIUM SERPL-MCNC: 9.5 MG/DL (ref 8.7–10.5)
CANNABINOIDS UR QL SCN: NEGATIVE
CHLORIDE SERPL-SCNC: 107 MMOL/L (ref 95–110)
CLARITY UR: CLEAR
CO2 SERPL-SCNC: 25 MMOL/L (ref 23–29)
COLOR UR: YELLOW
CREAT SERPL-MCNC: 0.8 MG/DL (ref 0.5–1.4)
CREAT UR-MCNC: 131.5 MG/DL (ref 23–375)
DIFFERENTIAL METHOD: ABNORMAL
EOSINOPHIL # BLD AUTO: 0.1 K/UL (ref 0–0.5)
EOSINOPHIL NFR BLD: 2.4 % (ref 0–8)
ERYTHROCYTE [DISTWIDTH] IN BLOOD BY AUTOMATED COUNT: 13.8 % (ref 11.5–14.5)
EST. GFR  (AFRICAN AMERICAN): >60 ML/MIN/1.73 M^2
EST. GFR  (NON AFRICAN AMERICAN): >60 ML/MIN/1.73 M^2
ETHANOL SERPL-MCNC: <10 MG/DL
GLUCOSE SERPL-MCNC: 72 MG/DL (ref 70–110)
GLUCOSE UR QL STRIP: NEGATIVE
HCT VFR BLD AUTO: 33.1 % (ref 40–54)
HGB BLD-MCNC: 10.7 G/DL (ref 14–18)
HGB UR QL STRIP: NEGATIVE
IMM GRANULOCYTES # BLD AUTO: 0 K/UL (ref 0–0.04)
IMM GRANULOCYTES NFR BLD AUTO: 0 % (ref 0–0.5)
KETONES UR QL STRIP: NEGATIVE
LEUKOCYTE ESTERASE UR QL STRIP: NEGATIVE
LYMPHOCYTES # BLD AUTO: 1.4 K/UL (ref 1–4.8)
LYMPHOCYTES NFR BLD: 36.2 % (ref 18–48)
MCH RBC QN AUTO: 28.6 PG (ref 27–31)
MCHC RBC AUTO-ENTMCNC: 32.3 G/DL (ref 32–36)
MCV RBC AUTO: 89 FL (ref 82–98)
METHADONE UR QL SCN>300 NG/ML: NEGATIVE
MONOCYTES # BLD AUTO: 0.5 K/UL (ref 0.3–1)
MONOCYTES NFR BLD: 12.6 % (ref 4–15)
NEUTROPHILS # BLD AUTO: 1.8 K/UL (ref 1.8–7.7)
NEUTROPHILS NFR BLD: 47.5 % (ref 38–73)
NITRITE UR QL STRIP: NEGATIVE
NRBC BLD-RTO: 0 /100 WBC
OPIATES UR QL SCN: NEGATIVE
PCP UR QL SCN>25 NG/ML: NEGATIVE
PH UR STRIP: 5 [PH] (ref 5–8)
PLATELET # BLD AUTO: 164 K/UL (ref 150–350)
PMV BLD AUTO: 10.3 FL (ref 9.2–12.9)
POTASSIUM SERPL-SCNC: 3.8 MMOL/L (ref 3.5–5.1)
PROT SERPL-MCNC: 7.2 G/DL (ref 6–8.4)
PROT UR QL STRIP: NEGATIVE
RBC # BLD AUTO: 3.74 M/UL (ref 4.6–6.2)
SARS-COV-2 RDRP RESP QL NAA+PROBE: NEGATIVE
SODIUM SERPL-SCNC: 141 MMOL/L (ref 136–145)
SP GR UR STRIP: >=1.03 (ref 1–1.03)
TOXICOLOGY INFORMATION: NORMAL
TSH SERPL DL<=0.005 MIU/L-ACNC: 2.34 UIU/ML (ref 0.4–4)
URN SPEC COLLECT METH UR: ABNORMAL
UROBILINOGEN UR STRIP-ACNC: NEGATIVE EU/DL
WBC # BLD AUTO: 3.81 K/UL (ref 3.9–12.7)

## 2020-08-18 PROCEDURE — 80307 DRUG TEST PRSMV CHEM ANLYZR: CPT

## 2020-08-18 PROCEDURE — U0002 COVID-19 LAB TEST NON-CDC: HCPCS

## 2020-08-18 PROCEDURE — 36415 COLL VENOUS BLD VENIPUNCTURE: CPT

## 2020-08-18 PROCEDURE — 84443 ASSAY THYROID STIM HORMONE: CPT

## 2020-08-18 PROCEDURE — 85025 COMPLETE CBC W/AUTO DIFF WBC: CPT

## 2020-08-18 PROCEDURE — 80329 ANALGESICS NON-OPIOID 1 OR 2: CPT

## 2020-08-18 PROCEDURE — 81003 URINALYSIS AUTO W/O SCOPE: CPT | Mod: 59

## 2020-08-18 PROCEDURE — 99285 EMERGENCY DEPT VISIT HI MDM: CPT

## 2020-08-18 PROCEDURE — 80320 DRUG SCREEN QUANTALCOHOLS: CPT

## 2020-08-18 PROCEDURE — 80053 COMPREHEN METABOLIC PANEL: CPT

## 2020-08-19 VITALS
SYSTOLIC BLOOD PRESSURE: 106 MMHG | DIASTOLIC BLOOD PRESSURE: 67 MMHG | HEART RATE: 63 BPM | BODY MASS INDEX: 17.65 KG/M2 | OXYGEN SATURATION: 96 % | RESPIRATION RATE: 18 BRPM | TEMPERATURE: 98 F | WEIGHT: 109.38 LBS

## 2020-08-19 NOTE — ED NOTES
Spd here for pt transport at this time. All belongings given to [facility] at this time.
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
## 2020-08-19 NOTE — ED NOTES
Pt belongings include:    Brown shoes  Grey pants  Grey long sleeve shirt  Lighter  Mask  Grocery bag with clothes  Toothbrush   Floss   Deodorant    Pt belongings secured in locker #28
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
## 2020-08-19 NOTE — ED NOTES
Pt. Resting in bed. No acute distress, RR equal and non labored, VSS. Bed in low, locked, and call light in reach. Sitter at bedside. Will continue to monitor.

## 2025-01-07 NOTE — RESIDENT HANDOFF
Handoff     Primary Team: Networked reference to record PCT  Room Number: 6067/6067 A     Patient Name: Dk Pereira MRN: [id number]     Date of Birth: [id number] Allergies: Pork/porcine containing products     Age: 34 y.o. Admit Date: 6/24/2020     Sex: male  BMI: Body mass index is 17.15 kg/m².     Code Status: Full Code        Illness Level (current clinical status): Watcher - No    Reason for Admission: <principal problem not specified>    Brief HPI 33 yo w/ MDD, anorexia, nervosa, borderline personailty disorder, presented as transfer last night for intentional tylenol overdose and transplant capable facility. LFTs peaked at 6k, downtrending, NAC ongoing.       Hospital Course LFTs downtrending, vit k and NAC ongoing. INR below 2. Never experienced change in mental status.    Tasks (specific, using if-then statements): Follow psych and hepatology recs. Tylenol level negative on admission. Continue NAC (4th dose ordered), trend LFTs. Patient expressed he'd like to go to [facility] upon discharge.      Estimated Discharge Date: 6/26    Discharge Disposition: Psychiatric Hospital       Apixaban/Eliquis is used to treat and prevent blood clots. If you are not able to swallow the tablets whole, they may be crushed and mixed in water, apple juice, or applesauce and promptly taken within four hours. Never skip a dose of Apixaban/Eliquis. If you forget to take your Apixaban/Eliquis, take a dose as soon as you remember. If it is almost time for your next Apixaban/Eliquis dose, wait until then and take a regular dose. DO NOT take an extra pill to ‘catch up’.  NEVER TAKE A DOUBLE DOSE. Notify your doctor that you missed a dose. Take Apixaban/Eliquis at the same time each morning and evening. Apixaban/Eliquis may be taken with other medication or food.